# Patient Record
Sex: MALE | Race: WHITE | NOT HISPANIC OR LATINO | Employment: FULL TIME | URBAN - METROPOLITAN AREA
[De-identification: names, ages, dates, MRNs, and addresses within clinical notes are randomized per-mention and may not be internally consistent; named-entity substitution may affect disease eponyms.]

---

## 2021-04-26 ENCOUNTER — LAB VISIT (OUTPATIENT)
Dept: LAB | Facility: HOSPITAL | Age: 21
End: 2021-04-26
Attending: INTERNAL MEDICINE
Payer: COMMERCIAL

## 2021-04-26 ENCOUNTER — TELEPHONE (OUTPATIENT)
Dept: FAMILY MEDICINE | Facility: CLINIC | Age: 21
End: 2021-04-26

## 2021-04-26 ENCOUNTER — OFFICE VISIT (OUTPATIENT)
Dept: FAMILY MEDICINE | Facility: CLINIC | Age: 21
End: 2021-04-26
Payer: COMMERCIAL

## 2021-04-26 VITALS
BODY MASS INDEX: 22.29 KG/M2 | HEIGHT: 67 IN | DIASTOLIC BLOOD PRESSURE: 60 MMHG | WEIGHT: 142 LBS | TEMPERATURE: 98 F | SYSTOLIC BLOOD PRESSURE: 101 MMHG | HEART RATE: 68 BPM

## 2021-04-26 DIAGNOSIS — Z11.59 ENCOUNTER FOR HEPATITIS C SCREENING TEST FOR LOW RISK PATIENT: ICD-10-CM

## 2021-04-26 DIAGNOSIS — Z13.220 ENCOUNTER FOR LIPID SCREENING FOR CARDIOVASCULAR DISEASE: ICD-10-CM

## 2021-04-26 DIAGNOSIS — Z11.4 SCREENING FOR HIV (HUMAN IMMUNODEFICIENCY VIRUS): ICD-10-CM

## 2021-04-26 DIAGNOSIS — R63.0 DECREASED APPETITE: Primary | ICD-10-CM

## 2021-04-26 DIAGNOSIS — Z13.6 ENCOUNTER FOR LIPID SCREENING FOR CARDIOVASCULAR DISEASE: ICD-10-CM

## 2021-04-26 DIAGNOSIS — F33.1 MODERATE EPISODE OF RECURRENT MAJOR DEPRESSIVE DISORDER: ICD-10-CM

## 2021-04-26 DIAGNOSIS — F60.3 BORDERLINE PERSONALITY DISORDER: ICD-10-CM

## 2021-04-26 DIAGNOSIS — R63.0 DECREASED APPETITE: ICD-10-CM

## 2021-04-26 DIAGNOSIS — Z00.00 ENCOUNTER FOR ANNUAL HEALTH EXAMINATION: ICD-10-CM

## 2021-04-26 LAB
ALBUMIN SERPL BCP-MCNC: 5 G/DL (ref 3.5–5.2)
ALP SERPL-CCNC: 49 U/L (ref 55–135)
ALT SERPL W/O P-5'-P-CCNC: 15 U/L (ref 10–44)
ANION GAP SERPL CALC-SCNC: 8 MMOL/L (ref 8–16)
AST SERPL-CCNC: 16 U/L (ref 10–40)
BILIRUB SERPL-MCNC: 1.2 MG/DL (ref 0.1–1)
BUN SERPL-MCNC: 18 MG/DL (ref 6–20)
CALCIUM SERPL-MCNC: 9.6 MG/DL (ref 8.7–10.5)
CHLORIDE SERPL-SCNC: 108 MMOL/L (ref 95–110)
CHOLEST SERPL-MCNC: 112 MG/DL (ref 120–199)
CHOLEST/HDLC SERPL: 2.7 {RATIO} (ref 2–5)
CO2 SERPL-SCNC: 26 MMOL/L (ref 23–29)
CREAT SERPL-MCNC: 0.9 MG/DL (ref 0.5–1.4)
ERYTHROCYTE [DISTWIDTH] IN BLOOD BY AUTOMATED COUNT: 12.4 % (ref 11.5–14.5)
EST. GFR  (AFRICAN AMERICAN): >60 ML/MIN/1.73 M^2
EST. GFR  (NON AFRICAN AMERICAN): >60 ML/MIN/1.73 M^2
GLUCOSE SERPL-MCNC: 99 MG/DL (ref 70–110)
HCT VFR BLD AUTO: 48 % (ref 40–54)
HDLC SERPL-MCNC: 41 MG/DL (ref 40–75)
HDLC SERPL: 36.6 % (ref 20–50)
HGB BLD-MCNC: 16.7 G/DL (ref 14–18)
LDLC SERPL CALC-MCNC: 63.2 MG/DL (ref 63–159)
MCH RBC QN AUTO: 31.2 PG (ref 27–31)
MCHC RBC AUTO-ENTMCNC: 34.8 G/DL (ref 32–36)
MCV RBC AUTO: 90 FL (ref 82–98)
NONHDLC SERPL-MCNC: 71 MG/DL
PLATELET # BLD AUTO: 246 K/UL (ref 150–450)
PMV BLD AUTO: 10.9 FL (ref 9.2–12.9)
POTASSIUM SERPL-SCNC: 4 MMOL/L (ref 3.5–5.1)
PROT SERPL-MCNC: 7.4 G/DL (ref 6–8.4)
RBC # BLD AUTO: 5.35 M/UL (ref 4.6–6.2)
SODIUM SERPL-SCNC: 142 MMOL/L (ref 136–145)
TRIGL SERPL-MCNC: 39 MG/DL (ref 30–150)
TSH SERPL DL<=0.005 MIU/L-ACNC: 0.98 UIU/ML (ref 0.4–4)
WBC # BLD AUTO: 5.29 K/UL (ref 3.9–12.7)

## 2021-04-26 PROCEDURE — 3008F PR BODY MASS INDEX (BMI) DOCUMENTED: ICD-10-PCS | Mod: CPTII,S$GLB,, | Performed by: INTERNAL MEDICINE

## 2021-04-26 PROCEDURE — 99203 PR OFFICE/OUTPT VISIT, NEW, LEVL III, 30-44 MIN: ICD-10-PCS | Mod: S$GLB,,, | Performed by: INTERNAL MEDICINE

## 2021-04-26 PROCEDURE — 80053 COMPREHEN METABOLIC PANEL: CPT | Performed by: INTERNAL MEDICINE

## 2021-04-26 PROCEDURE — 99999 PR PBB SHADOW E&M-NEW PATIENT-LVL III: ICD-10-PCS | Mod: PBBFAC,,, | Performed by: INTERNAL MEDICINE

## 2021-04-26 PROCEDURE — 85027 COMPLETE CBC AUTOMATED: CPT | Performed by: INTERNAL MEDICINE

## 2021-04-26 PROCEDURE — 36415 COLL VENOUS BLD VENIPUNCTURE: CPT | Mod: PO | Performed by: INTERNAL MEDICINE

## 2021-04-26 PROCEDURE — 84443 ASSAY THYROID STIM HORMONE: CPT | Performed by: INTERNAL MEDICINE

## 2021-04-26 PROCEDURE — 1126F AMNT PAIN NOTED NONE PRSNT: CPT | Mod: S$GLB,,, | Performed by: INTERNAL MEDICINE

## 2021-04-26 PROCEDURE — 80061 LIPID PANEL: CPT | Performed by: INTERNAL MEDICINE

## 2021-04-26 PROCEDURE — 86803 HEPATITIS C AB TEST: CPT | Performed by: INTERNAL MEDICINE

## 2021-04-26 PROCEDURE — 99999 PR PBB SHADOW E&M-NEW PATIENT-LVL III: CPT | Mod: PBBFAC,,, | Performed by: INTERNAL MEDICINE

## 2021-04-26 PROCEDURE — 99203 OFFICE O/P NEW LOW 30 MIN: CPT | Mod: S$GLB,,, | Performed by: INTERNAL MEDICINE

## 2021-04-26 PROCEDURE — 3008F BODY MASS INDEX DOCD: CPT | Mod: CPTII,S$GLB,, | Performed by: INTERNAL MEDICINE

## 2021-04-26 PROCEDURE — 86703 HIV-1/HIV-2 1 RESULT ANTBDY: CPT | Performed by: INTERNAL MEDICINE

## 2021-04-26 PROCEDURE — 1126F PR PAIN SEVERITY QUANTIFIED, NO PAIN PRESENT: ICD-10-PCS | Mod: S$GLB,,, | Performed by: INTERNAL MEDICINE

## 2021-04-27 LAB
HCV AB SERPL QL IA: NEGATIVE
HIV 1+2 AB+HIV1 P24 AG SERPL QL IA: NEGATIVE

## 2021-05-06 ENCOUNTER — PATIENT MESSAGE (OUTPATIENT)
Dept: PSYCHIATRY | Facility: CLINIC | Age: 21
End: 2021-05-06

## 2021-05-06 ENCOUNTER — OFFICE VISIT (OUTPATIENT)
Dept: PSYCHIATRY | Facility: CLINIC | Age: 21
End: 2021-05-06
Payer: COMMERCIAL

## 2021-05-06 DIAGNOSIS — F99 INSOMNIA DUE TO OTHER MENTAL DISORDER: ICD-10-CM

## 2021-05-06 DIAGNOSIS — F12.10 MILD CANNABIS USE DISORDER: ICD-10-CM

## 2021-05-06 DIAGNOSIS — F51.05 INSOMNIA DUE TO OTHER MENTAL DISORDER: ICD-10-CM

## 2021-05-06 DIAGNOSIS — F60.3 BORDERLINE PERSONALITY DISORDER: ICD-10-CM

## 2021-05-06 DIAGNOSIS — F33.2 SEVERE EPISODE OF RECURRENT MAJOR DEPRESSIVE DISORDER, WITHOUT PSYCHOTIC FEATURES: ICD-10-CM

## 2021-05-06 DIAGNOSIS — F41.1 GAD (GENERALIZED ANXIETY DISORDER): ICD-10-CM

## 2021-05-06 DIAGNOSIS — Z87.891 TOBACCO QUIT DATE ESTABLISHED: ICD-10-CM

## 2021-05-06 PROCEDURE — 99205 PR OFFICE/OUTPT VISIT, NEW, LEVL V, 60-74 MIN: ICD-10-PCS | Mod: 95,,, | Performed by: NURSE PRACTITIONER

## 2021-05-06 PROCEDURE — 99205 OFFICE O/P NEW HI 60 MIN: CPT | Mod: 95,,, | Performed by: NURSE PRACTITIONER

## 2021-05-06 RX ORDER — QUETIAPINE FUMARATE 100 MG/1
100 TABLET, FILM COATED ORAL NIGHTLY
Qty: 30 TABLET | Refills: 0 | Status: SHIPPED | OUTPATIENT
Start: 2021-05-06 | End: 2021-05-18 | Stop reason: SDUPTHER

## 2021-05-06 RX ORDER — SERTRALINE HYDROCHLORIDE 50 MG/1
50 TABLET, FILM COATED ORAL DAILY
Qty: 30 TABLET | Refills: 0 | Status: SHIPPED | OUTPATIENT
Start: 2021-05-14 | End: 2021-05-18 | Stop reason: SDUPTHER

## 2021-05-06 RX ORDER — SERTRALINE HYDROCHLORIDE 25 MG/1
25 TABLET, FILM COATED ORAL DAILY
Qty: 7 TABLET | Refills: 0 | Status: SHIPPED | OUTPATIENT
Start: 2021-05-06 | End: 2021-05-13

## 2021-05-18 ENCOUNTER — OFFICE VISIT (OUTPATIENT)
Dept: PSYCHIATRY | Facility: CLINIC | Age: 21
End: 2021-05-18
Payer: COMMERCIAL

## 2021-05-18 DIAGNOSIS — F33.2 SEVERE EPISODE OF RECURRENT MAJOR DEPRESSIVE DISORDER, WITHOUT PSYCHOTIC FEATURES: ICD-10-CM

## 2021-05-18 DIAGNOSIS — F41.1 GAD (GENERALIZED ANXIETY DISORDER): ICD-10-CM

## 2021-05-18 DIAGNOSIS — F51.05 INSOMNIA DUE TO OTHER MENTAL DISORDER: ICD-10-CM

## 2021-05-18 DIAGNOSIS — F60.3 BORDERLINE PERSONALITY DISORDER: ICD-10-CM

## 2021-05-18 DIAGNOSIS — F99 INSOMNIA DUE TO OTHER MENTAL DISORDER: ICD-10-CM

## 2021-05-18 PROBLEM — Z87.891 TOBACCO QUIT DATE ESTABLISHED: Status: ACTIVE | Noted: 2021-05-18

## 2021-05-18 PROBLEM — F12.10 MILD CANNABIS USE DISORDER: Status: ACTIVE | Noted: 2021-05-18

## 2021-05-18 PROCEDURE — 99214 PR OFFICE/OUTPT VISIT, EST, LEVL IV, 30-39 MIN: ICD-10-PCS | Mod: 95,,, | Performed by: NURSE PRACTITIONER

## 2021-05-18 PROCEDURE — 99214 OFFICE O/P EST MOD 30 MIN: CPT | Mod: 95,,, | Performed by: NURSE PRACTITIONER

## 2021-05-18 PROCEDURE — 90833 PSYTX W PT W E/M 30 MIN: CPT | Mod: 95,,, | Performed by: NURSE PRACTITIONER

## 2021-05-18 PROCEDURE — 90833 PR PSYCHOTHERAPY W/PATIENT W/E&M, 30 MIN (ADD ON): ICD-10-PCS | Mod: 95,,, | Performed by: NURSE PRACTITIONER

## 2021-05-18 RX ORDER — QUETIAPINE FUMARATE 100 MG/1
100 TABLET, FILM COATED ORAL NIGHTLY
Qty: 30 TABLET | Refills: 0 | Status: SHIPPED | OUTPATIENT
Start: 2021-06-06 | End: 2021-06-25

## 2021-05-18 RX ORDER — SERTRALINE HYDROCHLORIDE 50 MG/1
50 TABLET, FILM COATED ORAL DAILY
Qty: 30 TABLET | Refills: 0 | Status: SHIPPED | OUTPATIENT
Start: 2021-06-14 | End: 2021-06-25

## 2021-05-26 ENCOUNTER — OFFICE VISIT (OUTPATIENT)
Dept: PSYCHIATRY | Facility: CLINIC | Age: 21
End: 2021-05-26
Payer: COMMERCIAL

## 2021-05-26 DIAGNOSIS — F60.3 BORDERLINE PERSONALITY DISORDER: ICD-10-CM

## 2021-05-26 DIAGNOSIS — F99 INSOMNIA DUE TO OTHER MENTAL DISORDER: ICD-10-CM

## 2021-05-26 DIAGNOSIS — F33.1 MAJOR DEPRESSIVE DISORDER, RECURRENT EPISODE, MODERATE: Primary | ICD-10-CM

## 2021-05-26 DIAGNOSIS — F41.1 GAD (GENERALIZED ANXIETY DISORDER): ICD-10-CM

## 2021-05-26 DIAGNOSIS — F12.10 MILD CANNABIS USE DISORDER: ICD-10-CM

## 2021-05-26 DIAGNOSIS — F51.05 INSOMNIA DUE TO OTHER MENTAL DISORDER: ICD-10-CM

## 2021-05-26 PROCEDURE — 90791 PSYCH DIAGNOSTIC EVALUATION: CPT | Mod: S$GLB,,, | Performed by: SOCIAL WORKER

## 2021-05-26 PROCEDURE — 99999 PR PBB SHADOW E&M-EST. PATIENT-LVL I: ICD-10-PCS | Mod: PBBFAC,,, | Performed by: SOCIAL WORKER

## 2021-05-26 PROCEDURE — 90791 PR PSYCHIATRIC DIAGNOSTIC EVALUATION: ICD-10-PCS | Mod: S$GLB,,, | Performed by: SOCIAL WORKER

## 2021-05-26 PROCEDURE — 99999 PR PBB SHADOW E&M-EST. PATIENT-LVL I: CPT | Mod: PBBFAC,,, | Performed by: SOCIAL WORKER

## 2021-06-23 DIAGNOSIS — F33.2 SEVERE EPISODE OF RECURRENT MAJOR DEPRESSIVE DISORDER, WITHOUT PSYCHOTIC FEATURES: ICD-10-CM

## 2021-06-23 DIAGNOSIS — F60.3 BORDERLINE PERSONALITY DISORDER: ICD-10-CM

## 2021-06-23 DIAGNOSIS — F99 INSOMNIA DUE TO OTHER MENTAL DISORDER: ICD-10-CM

## 2021-06-23 DIAGNOSIS — F51.05 INSOMNIA DUE TO OTHER MENTAL DISORDER: ICD-10-CM

## 2021-06-23 DIAGNOSIS — F41.1 GAD (GENERALIZED ANXIETY DISORDER): ICD-10-CM

## 2021-06-25 RX ORDER — SERTRALINE HYDROCHLORIDE 50 MG/1
TABLET, FILM COATED ORAL
Qty: 30 TABLET | Refills: 0 | Status: SHIPPED | OUTPATIENT
Start: 2021-06-25 | End: 2021-06-29 | Stop reason: SDUPTHER

## 2021-06-25 RX ORDER — QUETIAPINE FUMARATE 100 MG/1
TABLET, FILM COATED ORAL
Qty: 30 TABLET | Refills: 0 | Status: SHIPPED | OUTPATIENT
Start: 2021-06-25 | End: 2021-06-29 | Stop reason: SDUPTHER

## 2021-06-28 ENCOUNTER — OFFICE VISIT (OUTPATIENT)
Dept: PSYCHIATRY | Facility: CLINIC | Age: 21
End: 2021-06-28
Payer: COMMERCIAL

## 2021-06-28 DIAGNOSIS — F99 INSOMNIA DUE TO OTHER MENTAL DISORDER: ICD-10-CM

## 2021-06-28 DIAGNOSIS — F33.2 SEVERE EPISODE OF RECURRENT MAJOR DEPRESSIVE DISORDER, WITHOUT PSYCHOTIC FEATURES: ICD-10-CM

## 2021-06-28 DIAGNOSIS — F51.05 INSOMNIA DUE TO OTHER MENTAL DISORDER: ICD-10-CM

## 2021-06-28 DIAGNOSIS — F41.1 GAD (GENERALIZED ANXIETY DISORDER): ICD-10-CM

## 2021-06-28 DIAGNOSIS — F60.3 BORDERLINE PERSONALITY DISORDER: ICD-10-CM

## 2021-06-28 PROCEDURE — 99214 OFFICE O/P EST MOD 30 MIN: CPT | Mod: 95,,, | Performed by: NURSE PRACTITIONER

## 2021-06-28 PROCEDURE — 99214 PR OFFICE/OUTPT VISIT, EST, LEVL IV, 30-39 MIN: ICD-10-PCS | Mod: 95,,, | Performed by: NURSE PRACTITIONER

## 2021-06-28 PROCEDURE — 90833 PSYTX W PT W E/M 30 MIN: CPT | Mod: 95,,, | Performed by: NURSE PRACTITIONER

## 2021-06-28 PROCEDURE — 90833 PR PSYCHOTHERAPY W/PATIENT W/E&M, 30 MIN (ADD ON): ICD-10-PCS | Mod: 95,,, | Performed by: NURSE PRACTITIONER

## 2021-06-29 RX ORDER — SERTRALINE HYDROCHLORIDE 50 MG/1
50 TABLET, FILM COATED ORAL DAILY
Qty: 30 TABLET | Refills: 2 | Status: SHIPPED | OUTPATIENT
Start: 2021-07-26 | End: 2021-10-26 | Stop reason: SDUPTHER

## 2021-06-29 RX ORDER — QUETIAPINE FUMARATE 100 MG/1
100 TABLET, FILM COATED ORAL NIGHTLY
Qty: 30 TABLET | Refills: 2 | Status: SHIPPED | OUTPATIENT
Start: 2021-07-26 | End: 2021-10-26 | Stop reason: SDUPTHER

## 2021-11-30 ENCOUNTER — PATIENT MESSAGE (OUTPATIENT)
Dept: PSYCHIATRY | Facility: CLINIC | Age: 21
End: 2021-11-30
Payer: COMMERCIAL

## 2021-12-01 ENCOUNTER — TELEPHONE (OUTPATIENT)
Dept: PSYCHIATRY | Facility: CLINIC | Age: 21
End: 2021-12-01
Payer: COMMERCIAL

## 2021-12-01 DIAGNOSIS — F41.1 GAD (GENERALIZED ANXIETY DISORDER): ICD-10-CM

## 2021-12-01 DIAGNOSIS — F33.2 SEVERE EPISODE OF RECURRENT MAJOR DEPRESSIVE DISORDER, WITHOUT PSYCHOTIC FEATURES: ICD-10-CM

## 2021-12-01 RX ORDER — SERTRALINE HYDROCHLORIDE 50 MG/1
50 TABLET, FILM COATED ORAL DAILY
Qty: 90 TABLET | Refills: 1 | Status: SHIPPED | OUTPATIENT
Start: 2021-12-01 | End: 2022-03-08 | Stop reason: SDUPTHER

## 2022-01-31 ENCOUNTER — PATIENT MESSAGE (OUTPATIENT)
Dept: PSYCHIATRY | Facility: CLINIC | Age: 22
End: 2022-01-31
Payer: COMMERCIAL

## 2022-02-03 ENCOUNTER — TELEPHONE (OUTPATIENT)
Dept: PSYCHIATRY | Facility: CLINIC | Age: 22
End: 2022-02-03
Payer: COMMERCIAL

## 2022-02-03 ENCOUNTER — PATIENT MESSAGE (OUTPATIENT)
Dept: PSYCHIATRY | Facility: CLINIC | Age: 22
End: 2022-02-03
Payer: COMMERCIAL

## 2022-02-03 NOTE — TELEPHONE ENCOUNTER
Phone not working, sent my chart message to let pt know appt for today will be canceled due to provider out

## 2022-02-08 ENCOUNTER — OFFICE VISIT (OUTPATIENT)
Dept: PSYCHIATRY | Facility: CLINIC | Age: 22
End: 2022-02-08
Payer: COMMERCIAL

## 2022-02-08 DIAGNOSIS — F51.05 INSOMNIA DUE TO OTHER MENTAL DISORDER: ICD-10-CM

## 2022-02-08 DIAGNOSIS — R63.5 WEIGHT GAIN DUE TO MEDICATION: ICD-10-CM

## 2022-02-08 DIAGNOSIS — Z01.89: ICD-10-CM

## 2022-02-08 DIAGNOSIS — F33.0 MDD (MAJOR DEPRESSIVE DISORDER), RECURRENT EPISODE, MILD: ICD-10-CM

## 2022-02-08 DIAGNOSIS — F60.3 BORDERLINE PERSONALITY DISORDER: ICD-10-CM

## 2022-02-08 DIAGNOSIS — T50.905A WEIGHT GAIN DUE TO MEDICATION: ICD-10-CM

## 2022-02-08 DIAGNOSIS — F41.1 GAD (GENERALIZED ANXIETY DISORDER): ICD-10-CM

## 2022-02-08 DIAGNOSIS — F99 INSOMNIA DUE TO OTHER MENTAL DISORDER: ICD-10-CM

## 2022-02-08 PROCEDURE — 90833 PSYTX W PT W E/M 30 MIN: CPT | Mod: 95,,, | Performed by: NURSE PRACTITIONER

## 2022-02-08 PROCEDURE — 90833 PR PSYCHOTHERAPY W/PATIENT W/E&M, 30 MIN (ADD ON): ICD-10-PCS | Mod: 95,,, | Performed by: NURSE PRACTITIONER

## 2022-02-08 PROCEDURE — 99215 OFFICE O/P EST HI 40 MIN: CPT | Mod: 95,,, | Performed by: NURSE PRACTITIONER

## 2022-02-08 PROCEDURE — 99215 PR OFFICE/OUTPT VISIT, EST, LEVL V, 40-54 MIN: ICD-10-PCS | Mod: 95,,, | Performed by: NURSE PRACTITIONER

## 2022-02-08 RX ORDER — ZIPRASIDONE HYDROCHLORIDE 20 MG/1
CAPSULE ORAL
Qty: 60 CAPSULE | Refills: 0 | Status: SHIPPED | OUTPATIENT
Start: 2022-02-10 | End: 2022-03-11

## 2022-02-08 NOTE — PROGRESS NOTES
The patient location is:  [place], [place] at work    The chief complaint leading to consultation is:  Medication Mgt Follow Up    Visit type: audiovisual    Each patient to whom he or she provides medical services by telemedicine is:  (1) informed of the relationship between the physician and patient and the respective role of any other health care provider with respect to management of the patient; and (2) notified that he or she may decline to receive medical services by telemedicine and may withdraw from such care at any time.    Kiet Paulson   @  @   [phone]      Outpatient Psychiatry Follow-Up Visit with MD        Clinical Status of Patient: Outpatient (Ambulatory)    Chief Complaint:  Kiet Paulson is a 20 y.o. male who presents today for follow-up of depression, mood disorder, anxiety and insomnia.  Met with patient.      Interval History:  Patient presented 2 weeks ago on 05/06/2021 with a history of BPD.  Dx with BPD, MDD, RISHI, Insomnia, rule out Bipolar I  He was prescribed:  quetiapine fumarate 100 mg Oral Nightly, Break or cut in half the first 3 nights taking 50 mg (one half tablet) then on the 4th day increase to one whole tablet (100 mg)  sertraline HCl  50 mg Oral Daily  25 mg Oral Daily  He was scheduled a 2 week med mgt follow up and referred to psychotherapy.        Content of Current Session 2/8/2022:   Interview conducted and patient (pt) examined by this PMHNP. Pt is awake, alert, and oriented x 4.  Patient is calm and cooperative during interview.     Pt reports he was 140---145 when first started taking the medication and now 208 pounds. Patient reports has gained weight really fast still gains weight non-stop.    Denies eating junk food, stopped drinking soda, stopped eating red meat---still no improvement..  States has picked up more physical activinty  Pt reports has lost muscle mass and has more of  fatty weight--pt reports has gained a lot of weight in his stomach    Pt  continues to deny SI, HI, AVH, and paranoid thoughts.  Denies issues with sleep.  Continues to verbalize to his Anxiety and depression are under control with current medication regimen.  Son is now 2 years old---states he is very smart.    Discontinuing QUEtiapine (SEROQUEL) 100 MG TabTake 1 tablet (100 mg total) and break it in half to take (50 mg) for 2 nights 2/8 and 2/9--then stop taking.  Continue to take sertraline (ZOLOFT) 50 MG tablet  Take 1 tablet (50 mg total) by mouth once daily. Take 1 tablet (50 mg total) by mouth once daily., Starting Wed 12/1/2021, Until Mon 5/30/2022.    Pt reports he isn't drinking alcoholic beverages.  Pt reports he is not using illicit substances.      Pt reports he started college and is doing well- at [employer].  Spending time with his son.  Giving parents--mother rent $700 per month while he and his family live with them.  Wife is now licensed massage therapist---starts work [employer] plan to move into own home in a few months.      Pt is goal directed and doing well.  In a few months he will be in remission of symptoms if theses same results continue.      Pt reports he is complying with his medication regimen.  Pt denies any adverse affects or side effects with regards to medication regimen.  Pt reports medications are working and his overall condition is improving from previous assessment.    Pt is stable at this time. Pt denies any questions or concerns. Will continue to monitor and reassess.       [facility] Gad[facility]    Question 2/8/2022  1:47 PM CST - Filed by Patient   Feeling nervous, anxious, on edge Not at all   Not being able to stop or control worrying Not at all   Worrying too much about different things Not at all   Trouble relaxing Not at all   Being so restless that its hard to sit still Not at all   Becoming easily annoyed or irritable Not at all   Feeling afraid as if something awful might happen Not at all   TOTAL SCORE: (range: 0 - 21) 0  "    [facility] Documents    Question 2/8/2022  1:47 PM CST - Filed by Patient   Would you like a copy of [facility]'s Financial Assistance Policy Summary? No, I would not like a copy.   Would you like to receive more information regarding your rights and protections under the [place] Billing act? No, I would not.     [facility] Tele[facility] Phq9    Question 2/8/2022  1:48 PM CST - Filed by Patient   By selecting "I understand," you acknowledge that the answers that you provide for this questionnaire may not be immediately viewed by your provider or your care team. If you are personally dealing with suicidal thoughts or a crisis, please consider contacting your provider's office.  If your provider is not available, please consider taking action by: calling 911 or the National Suicide Prevention Lifeline any day, any time at 1-810.187.7015 or texting SIGNS to 078401 for 24/7 anonymous, free crisis counseling. I understand   1. Little interest or pleasure in doing things Not at all   2. Feeling down, depressed, or hopeless Not at all   3. Trouble falling or staying asleep, or sleeping too much Not at all   4. Feeling tired or having little energy Not at all   5. poor appetite or overeating Not at all   6. Feeling bad about yourself - or that you are a failure or have let yourself or your family down Not at all   7. Trouble concentrating on things, such as reading the newspaper or watching television Not at all   8. Moving or speaking so slowly that other people could have noticed? Or the opposite - being so fidgety or restless that you have been moving around a lot more than usual Not at all   9. Thoughts that you would be better off dead or hurting yourself in some way Not at all   If you indicated that you had ANY of the problems listed in questions 1-9, how difficult have these problems made it for you to do your work, take care of things at home, or get along with other people? Not difficult at all   TOTAL SCORE (range: " "0 - 27) 0 (None)         Review of Systems    General : NO chills or fever   Eyes: NO  visual changes   ENT: NO hearing change, nasal discharge or sore throat   Endocrine: NO weight changes or polydipsia/polyuria   Dermatological: NO rashes   Respiratory: NO cough, shortness of breath   Cardiovascular: NO chest pain, palpitations or racing heart   Gastrointestinal: NO nausea, vomiting, constipation or diarrhea   Musculoskeletal: NO muscle pain or stiffness   Neurological: NO confusion, dizziness, headaches or tremors   Psychiatric: please see HPI    Past Medical, Family and Social History: The patient's past medical, family and social history have been reviewed and updated as appropriate within the electronic medical record - see encounter notes.    Compliance: see above    Side effects: see above    Risk Parameters:  Patient reports no suicidal ideation  Patient reports no homicidal ideation  Patient reports no self-injurious behavior  Patient reports no violent behavior    Exam (detailed: at least 9 elements; comprehensive: all 15 elements)     There were no vitals filed for this visit.    Constitutional  Vitals:  Most recent vital signs, dated 5/6/2021, were reviewed.   There were no vitals filed for this visit.     General:  unremarkable, age appropriate, neatly groomed     Musculoskeletal  Muscle Strength/Tone:  not examined   Gait & Station:  [facility]     Psychiatric  Arousal: Alert, awake  Appearance:  fair grooming  Sensorium/Orientation: Oriented to person, place, situation, month and year  Behavior/Cooperation: Cooperative, friendly  Psychomotor: No PMA or PMR  Speech: Normal rate, rhythm, prosody and tone  Language: Fluent english  Mood: "Good"  Affect: Reactive, mood congruent  Thought Process: Linear   Thought Content: No SI/HI; no hallucinations or delusions  Associations: Intact  Attention/Concentration: Intact  Memory: Recent and remote intact  Fund of Knowledge: Appropriate for education level "   Insight: Fair   Judgment: Appropriate for setting    Lab Visit on 04/26/2021   Component Date Value Ref Range Status    WBC 04/26/2021 5.29  3.9 - 12.7 K/uL Final    RBC 04/26/2021 5.35  4.60 - 6.20 M/uL Final    Hemoglobin 04/26/2021 16.7  14.0 - 18.0 g/dL Final    Hematocrit 04/26/2021 48.0  40.0 - 54.0 % Final    MCV 04/26/2021 90  82.0 - 98.0 fL Final    MCH 04/26/2021 31.2* 27.0 - 31.0 pg Final    MCHC 04/26/2021 34.8  32.0 - 36.0 g/dL Final    RDW 04/26/2021 12.4  11.5 - 14.5 % Final    Platelets 04/26/2021 246  150 - 450 K/uL Final    MPV 04/26/2021 10.9  9.2 - 12.9 fL Final    Sodium 04/26/2021 142  136 - 145 mmol/L Final    Potassium 04/26/2021 4.0  3.5 - 5.1 mmol/L Final    Chloride 04/26/2021 108  95 - 110 mmol/L Final    CO2 04/26/2021 26  23 - 29 mmol/L Final    Glucose 04/26/2021 99  70 - 110 mg/dL Final    BUN 04/26/2021 18  6 - 20 mg/dL Final    Creatinine 04/26/2021 0.9  0.5 - 1.4 mg/dL Final    Calcium 04/26/2021 9.6  8.7 - 10.5 mg/dL Final    Total Protein 04/26/2021 7.4  6.0 - 8.4 g/dL Final    Albumin 04/26/2021 5.0  3.5 - 5.2 g/dL Final    Total Bilirubin 04/26/2021 1.2* 0.1 - 1.0 mg/dL Final    Alkaline Phosphatase 04/26/2021 49* 55 - 135 U/L Final    AST 04/26/2021 16  10 - 40 U/L Final    ALT 04/26/2021 15  10 - 44 U/L Final    Anion Gap 04/26/2021 8  8 - 16 mmol/L Final    eGFR if African American 04/26/2021 >60.0  >60 mL/min/1.73 m^2 Final    eGFR if non African American 04/26/2021 >60.0  >60 mL/min/1.73 m^2 Final    Cholesterol 04/26/2021 112* 120 - 199 mg/dL Final    Triglycerides 04/26/2021 39  30 - 150 mg/dL Final    HDL 04/26/2021 41  40 - 75 mg/dL Final    LDL Cholesterol 04/26/2021 63.2  63 - 159 mg/dL Final    HDL/Cholesterol Ratio 04/26/2021 36.6  20.0 - 50.0 % Final    Total Cholesterol/HDL Ratio 04/26/2021 2.7  2.0 - 5.0 Final    Non-HDL Cholesterol 04/26/2021 71  mg/dL Final    TSH 04/26/2021 0.976  0.40 - 4.00 uIU/mL Final    Hepatitis C  Ab 04/26/2021 Negative  Negative Final    HIV 1/2 Ag/Ab 04/26/2021 Negative  Negative Final       LAPMP    Assessed safety-Reminded of the number and the availability of clinic staff and ER/Hospitals     Assessment and Diagnosis     Status/Progress: Based on the examination today, the patient's problem(s) is/are improving. New problems have not presented today. Co-morbidities are not complicating management of the primary condition. There are not active rule-out diagnoses for this patient at this time.     General Impression: Kiet Paulson, a 20 y.o. male, presenting for medication management follow up visit.    Pt has a previous history of BPD, MDD, RISHI, and Insomnia.      1. Insomnia due to other mental disorder  ziprasidone (GEODON) 20 MG Cap    Nursing communication   2. Borderline personality disorder  ziprasidone (GEODON) 20 MG Cap    Nursing communication   3. Encounter for basal metabolic rate test  Hemoglobin A1C   4. Weight gain due to medication  Hemoglobin A1C   5. RISHI (generalized anxiety disorder)     6. MDD (major depressive disorder), recurrent episode, mild  Nursing communication     Patient is improving and so far is responding well. .          Intervention/Counseling/Treatment Plan   Medical Decision Making:   · Medication Management:   Medications Ordered This Encounter   Medications    ziprasidone (GEODON) 20 MG Cap     Sig: Take 1 capsule (20 mg) at night for 2 weeks 02/10--02/24 and 02/25--03/11 take 2 capsules (40 mg) at night for 2 weeks     Dispense:  60 capsule     Refill:  0     Discontinuing QUEtiapine (SEROQUEL) 100 MG TabTake 1 tablet (100 mg total) and break it in half to take (50 mg) for 2 nights 2/8 and 2/9--then stop taking.  Continue to take sertraline (ZOLOFT) 50 MG tablet  Take 1 tablet (50 mg total) by mouth once daily. Take 1 tablet (50 mg total) by mouth once daily., Starting Wed 12/1/2021, Until Mon 5/30/2022.      · Labs, Diagnostic Studies:            Hemoglobin A1C                   Future, Expected: 2/8/2022, Expires: 4/9/2023, Routine, Lab Collect, Resulting Agency - [facility]       · Outside records/collateral information from additional sources:  · Care Coordination: N/a at this time  · Duration of visit: 48 minutes.      Psychotherapy:  · Target symptoms: depression, anxiety , substance abuse, mood swings  · Why chosen therapy is appropriate versus another modality: relevant to diagnosis, patient responds to this modality, evidence based practice  · Outcome monitoring methods: self-report  · Therapeutic intervention type: insight oriented psychotherapy, behavior modifying psychotherapy, supportive psychotherapy  · Topics discussed/themes: building skills sets for symptom management, symptom recognition, substance abuse  · The patient's response to the intervention is accepting. The patient's progress toward treatment goals is good.   · Duration of intervention: 16 minutes.    Discussed diagnosis, risks and benefits of proposed treatment above vs alternative treatments vs no treatment, and potential side effects of these treatments. Patient expresses understanding of the above and displays the capacity to agree with this treatment given said understanding. Patient also agrees that, currently, the benefits outweigh the risks and would like to pursue treatment at this time.     Return to Clinic: 3 months if medication change is effective 1 month or as needed if ineffective depending on level of acuity       Add on Psychotherapy time: 16 minutes  Length of Visit and chart review: 60 minutes      DONTAE Landry-BC [facility]    This note may have been partially created with [employer] Natural speaking word recognition program.  There may be word recognition mistakes that are occasionally missed on review.  Please interpret accordingly.

## 2022-02-08 NOTE — PATIENT INSTRUCTIONS
Good Morning/Good Afternoon:    It was a pleasure seeing you today.  Please remember if you have any questions or concerns to reach out to the office via [employer] Message or calling the office at [phone].  Remember, messages and calls are responded to by office staff Monday through Friday 7:00 AM --5:00 PM.  There are no after hour or on call staff in the Psychiatry Department for evening/nights/ or weekends.  Any messages sent during these time frames are not seen by staff until the next business day.  This provider is in the office Tuesday--Friday and will receive any weekend and Monday notifications on Tuesday.    If you have any paperwork you need this provider to complete; the [facility] has a 7 to 10 business day for completion policy from the date when the provider physically receives the document(s).  You will be notified via Akvo on the day this provider receives your paperwork.       If in Crisis call the Lifeline (999) 273-TALK, and 911 for immediate assistance.      If an urgent or emergent need develops and you are experiencing any symptoms that are affecting your ability to function normally, having thoughts of self harm or harming anyone else, call 911 or report to the closest Emergency Department.    [employer] is a reputable source for general information regarding mental health [email or web address]    Psychiatry staff will schedule your next appointment for medication management in [facility] for the time frame we discussed (3 months if medication change is effective; 1 month or as needed if ineffective depending on level of acuity).       · Medication Management:   Medications Ordered This Encounter   Medications    ziprasidone (GEODON) 20 MG Cap     Sig: Take 1 capsule (20 mg) at night for 2 weeks 02/10--02/24 and 02/25--03/11 take 2 capsules (40 mg) at night for 2 weeks     Dispense:  60 capsule     Refill:  0     Discontinuing QUEtiapine (SEROQUEL) 100 MG TabTake 1  tablet (100 mg total) and break it in half to take (50 mg) for 2 nights 2/8 and 2/9--then stop taking.  Continue to take sertraline (ZOLOFT) 50 MG tablet  Take 1 tablet (50 mg total) by mouth once daily. Take 1 tablet (50 mg total) by mouth once daily., Starting Wed 12/1/2021, Until Mon 5/30/2022.        · Labs, Diagnostic Studies:            Hemoglobin A1C                  Future, Expected: 2/8/2022, Expires: 4/9/2023, Routine, Lab Collect, Resulting Agency - [facility]

## 2022-02-09 ENCOUNTER — LAB VISIT (OUTPATIENT)
Dept: LAB | Facility: HOSPITAL | Age: 22
End: 2022-02-09
Attending: NURSE PRACTITIONER
Payer: COMMERCIAL

## 2022-02-09 DIAGNOSIS — T50.905A WEIGHT GAIN DUE TO MEDICATION: ICD-10-CM

## 2022-02-09 DIAGNOSIS — Z01.89: ICD-10-CM

## 2022-02-09 DIAGNOSIS — R63.5 WEIGHT GAIN DUE TO MEDICATION: ICD-10-CM

## 2022-02-09 PROCEDURE — 36415 COLL VENOUS BLD VENIPUNCTURE: CPT | Performed by: NURSE PRACTITIONER

## 2022-02-09 PROCEDURE — 83036 HEMOGLOBIN GLYCOSYLATED A1C: CPT | Performed by: NURSE PRACTITIONER

## 2022-02-10 LAB
ESTIMATED AVG GLUCOSE: 94 MG/DL (ref 68–131)
HBA1C MFR BLD: 4.9 % (ref 4–5.6)

## 2022-03-08 ENCOUNTER — TELEPHONE (OUTPATIENT)
Dept: PSYCHIATRY | Facility: CLINIC | Age: 22
End: 2022-03-08
Payer: COMMERCIAL

## 2022-03-11 RX ORDER — ZIPRASIDONE HYDROCHLORIDE 40 MG/1
40 CAPSULE ORAL NIGHTLY
Qty: 30 CAPSULE | Refills: 2 | Status: SHIPPED | OUTPATIENT
Start: 2022-03-11 | End: 2022-04-19

## 2022-04-19 ENCOUNTER — OFFICE VISIT (OUTPATIENT)
Dept: PSYCHIATRY | Facility: CLINIC | Age: 22
End: 2022-04-19
Payer: COMMERCIAL

## 2022-04-19 DIAGNOSIS — T50.905A WEIGHT GAIN DUE TO MEDICATION: ICD-10-CM

## 2022-04-19 DIAGNOSIS — F60.3 BORDERLINE PERSONALITY DISORDER: ICD-10-CM

## 2022-04-19 DIAGNOSIS — F51.05 INSOMNIA DUE TO OTHER MENTAL DISORDER: ICD-10-CM

## 2022-04-19 DIAGNOSIS — F41.1 GAD (GENERALIZED ANXIETY DISORDER): ICD-10-CM

## 2022-04-19 DIAGNOSIS — F33.0 MDD (MAJOR DEPRESSIVE DISORDER), RECURRENT EPISODE, MILD: ICD-10-CM

## 2022-04-19 DIAGNOSIS — F99 INSOMNIA DUE TO OTHER MENTAL DISORDER: ICD-10-CM

## 2022-04-19 DIAGNOSIS — Z87.891 TOBACCO QUIT DATE ESTABLISHED: ICD-10-CM

## 2022-04-19 DIAGNOSIS — R63.5 WEIGHT GAIN DUE TO MEDICATION: ICD-10-CM

## 2022-04-19 PROCEDURE — 3044F PR MOST RECENT HEMOGLOBIN A1C LEVEL <7.0%: ICD-10-PCS | Mod: CPTII,95,, | Performed by: NURSE PRACTITIONER

## 2022-04-19 PROCEDURE — 99215 PR OFFICE/OUTPT VISIT, EST, LEVL V, 40-54 MIN: ICD-10-PCS | Mod: 95,,, | Performed by: NURSE PRACTITIONER

## 2022-04-19 PROCEDURE — 3044F HG A1C LEVEL LT 7.0%: CPT | Mod: CPTII,95,, | Performed by: NURSE PRACTITIONER

## 2022-04-19 PROCEDURE — 99215 OFFICE O/P EST HI 40 MIN: CPT | Mod: 95,,, | Performed by: NURSE PRACTITIONER

## 2022-04-19 RX ORDER — ZIPRASIDONE HYDROCHLORIDE 80 MG/1
80 CAPSULE ORAL NIGHTLY
Qty: 30 CAPSULE | Refills: 2 | Status: SHIPPED | OUTPATIENT
Start: 2022-04-19 | End: 2022-08-02 | Stop reason: SDUPTHER

## 2022-04-19 NOTE — PROGRESS NOTES
The patient location is:  [place], [place] at work    The chief complaint leading to consultation is:  Medication Mgt Follow Up    Visit type: audiovisual    Each patient to whom he or she provides medical services by telemedicine is:  (1) informed of the relationship between the physician and patient and the respective role of any other health care provider with respect to management of the patient; and (2) notified that he or she may decline to receive medical services by telemedicine and may withdraw from such care at any time.    Kiet Paulson   @  @   [phone]      Outpatient Psychiatry Follow-Up Visit with MD        Clinical Status of Patient: Outpatient (Ambulatory)    Chief Complaint:  Kiet Paulson is a 20 y.o. male who presents today for follow-up of depression, mood disorder, anxiety and insomnia.  Met with patient.      Interval History:  Patient presented 2 weeks ago on 05/06/2021 with a history of BPD.  Dx with BPD, MDD, RISHI, Insomnia, rule out Bipolar I  He was prescribed:  quetiapine fumarate 100 mg Oral Nightly, Break or cut in half the first 3 nights taking 50 mg (one half tablet) then on the 4th day increase to one whole tablet (100 mg)  sertraline HCl  50 mg Oral Daily  25 mg Oral Daily  He was scheduled a 2 week med mgt follow up and referred to psychotherapy.        Content of Current Session 04/19/22::   Interview conducted and patient (pt) examined by this PMHNP. Pt is awake, alert, and oriented x 4.  Patient is calm and cooperative during interview.     Finished with a 4.0 GPA this semester--Major in    Pt has a glow on his face regarding his major when discussing  What his career responsiilities and tasks are.    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    Has slimmed down some per pt and feels like he has more energy, feels happy with the geodon    Pt reports he was 140---145 when first started taking  the medication and now 208 pounds. Patient reports has gained weight really fast still gains weight non-stop.    Denies eating junk food, stopped drinking soda, stopped eating red meat---still no improvement..  States has picked up more physical activinty  Pt reports has lost muscle mass and has more of  fatty weight--pt reports has gained a lot of weight in his stomach    Pt continues to deny SI, HI, AVH, and paranoid thoughts.  Denies issues with sleep.  Continues to verbalize to his Anxiety and depression are under control with current medication regimen.  Son is now 2 years old---states he is very smart.    Discontinuing QUEtiapine (SEROQUEL) 100 MG TabTake 1 tablet (100 mg total) and break it in half to take (50 mg) for 2 nights 2/8 and 2/9--then stop taking.  Continue to take sertraline (ZOLOFT) 50 MG tablet  Take 1 tablet (50 mg total) by mouth once daily. Take 1 tablet (50 mg total) by mouth once daily., Starting Wed 12/1/2021, Until Mon 5/30/2022.    Pt reports he isn't drinking alcoholic beverages.  Pt reports he is not using illicit substances.      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  Spending time with his son.  Giving parents--mother rent $700 per month while he and his family live with them.  Wife is now licensed massage therapist---starts work [employer] plan to move into own home in a few months.      Pt is goal directed and doing well.  In a few months he will be in remission of symptoms if theses same results continue.      Pt reports he is complying with his medication regimen.  Pt denies any adverse affects or side effects with regards to medication regimen.  Pt reports medications are working and his overall condition is improving from previous assessment.    Pt is stable at this time. Pt denies any questions or concerns. Will continue to monitor and reassess.       [facility] Telepsych Gad7    Question 2/8/2022  1:47 PM CST - Filed by Patient   Feeling  "nervous, anxious, on edge Not at all   Not being able to stop or control worrying Not at all   Worrying too much about different things Not at all   Trouble relaxing Not at all   Being so restless that its hard to sit still Not at all   Becoming easily annoyed or irritable Not at all   Feeling afraid as if something awful might happen Not at all   TOTAL SCORE: (range: 0 - 21) 0     [facility] Peq Documents    Question 2/8/2022  1:47 PM CST - Filed by Patient   Would you like a copy of [facility]'s Financial Assistance Policy Summary? No, I would not like a copy.   Would you like to receive more information regarding your rights and protections under the [employer] Billing act? No, I would not.     [facility] Telepsych Phq9    Question 2/8/2022  1:48 PM CST - Filed by Patient   By selecting "I understand," you acknowledge that the answers that you provide for this questionnaire may not be immediately viewed by your provider or your care team. If you are personally dealing with suicidal thoughts or a crisis, please consider contacting your provider's office.  If your provider is not available, please consider taking action by: calling 911 or the National Suicide Prevention Lifeline any day, any time at 1-551.295.4159 or texting SIGNS to 637646 for 24/7 anonymous, free crisis counseling. I understand   1. Little interest or pleasure in doing things Not at all   2. Feeling down, depressed, or hopeless Not at all   3. Trouble falling or staying asleep, or sleeping too much Not at all   4. Feeling tired or having little energy Not at all   5. poor appetite or overeating Not at all   6. Feeling bad about yourself - or that you are a failure or have let yourself or your family down Not at all   7. Trouble concentrating on things, such as reading the newspaper or watching television Not at all   8. Moving or speaking so slowly that other people could have noticed? Or the opposite - being so fidgety or restless that you have been " moving around a lot more than usual Not at all   9. Thoughts that you would be better off dead or hurting yourself in some way Not at all   If you indicated that you had ANY of the problems listed in questions 1-9, how difficult have these problems made it for you to do your work, take care of things at home, or get along with other people? Not difficult at all   TOTAL SCORE (range: 0 - 27) 0 (None)         Review of Systems    General : NO chills or fever   Eyes: NO  visual changes   ENT: NO hearing change, nasal discharge or sore throat   Endocrine: NO weight changes or polydipsia/polyuria   Dermatological: NO rashes   Respiratory: NO cough, shortness of breath   Cardiovascular: NO chest pain, palpitations or racing heart   Gastrointestinal: NO nausea, vomiting, constipation or diarrhea   Musculoskeletal: NO muscle pain or stiffness   Neurological: NO confusion, dizziness, headaches or tremors   Psychiatric: please see HPI    Past Medical, Family and Social History: The patient's past medical, family and social history have been reviewed and updated as appropriate within the electronic medical record - see encounter notes.    Compliance: see above    Side effects: see above    Risk Parameters:  Patient reports no suicidal ideation  Patient reports no homicidal ideation  Patient reports no self-injurious behavior  Patient reports no violent behavior    Exam (detailed: at least 9 elements; comprehensive: all 15 elements)     There were no vitals filed for this visit.    Constitutional  Vitals:  Most recent vital signs, dated 5/6/2021, were reviewed.   There were no vitals filed for this visit.     General:  unremarkable, age appropriate, neatly groomed     Musculoskeletal  Muscle Strength/Tone:  not examined   Gait & Station:  [facility]     Psychiatric  Arousal: Alert, awake  Appearance:  fair grooming  Sensorium/Orientation: Oriented to person, place, situation, month and year  Behavior/Cooperation:  "Cooperative, friendly  Psychomotor: No PMA or PMR  Speech: Normal rate, rhythm, prosody and tone  Language: Fluent english  Mood: "Good"  Affect: Reactive, mood congruent  Thought Process: Linear   Thought Content: No SI/HI; no hallucinations or delusions  Associations: Intact  Attention/Concentration: Intact  Memory: Recent and remote intact  Fund of Knowledge: Appropriate for education level   Insight: Fair   Judgment: Appropriate for setting    Lab Visit on 04/26/2021   Component Date Value Ref Range Status    WBC 04/26/2021 5.29  3.9 - 12.7 K/uL Final    RBC 04/26/2021 5.35  4.60 - 6.20 M/uL Final    Hemoglobin 04/26/2021 16.7  14.0 - 18.0 g/dL Final    Hematocrit 04/26/2021 48.0  40.0 - 54.0 % Final    MCV 04/26/2021 90  82.0 - 98.0 fL Final    MCH 04/26/2021 31.2* 27.0 - 31.0 pg Final    MCHC 04/26/2021 34.8  32.0 - 36.0 g/dL Final    RDW 04/26/2021 12.4  11.5 - 14.5 % Final    Platelets 04/26/2021 246  150 - 450 K/uL Final    MPV 04/26/2021 10.9  9.2 - 12.9 fL Final    Sodium 04/26/2021 142  136 - 145 mmol/L Final    Potassium 04/26/2021 4.0  3.5 - 5.1 mmol/L Final    Chloride 04/26/2021 108  95 - 110 mmol/L Final    CO2 04/26/2021 26  23 - 29 mmol/L Final    Glucose 04/26/2021 99  70 - 110 mg/dL Final    BUN 04/26/2021 18  6 - 20 mg/dL Final    Creatinine 04/26/2021 0.9  0.5 - 1.4 mg/dL Final    Calcium 04/26/2021 9.6  8.7 - 10.5 mg/dL Final    Total Protein 04/26/2021 7.4  6.0 - 8.4 g/dL Final    Albumin 04/26/2021 5.0  3.5 - 5.2 g/dL Final    Total Bilirubin 04/26/2021 1.2* 0.1 - 1.0 mg/dL Final    Alkaline Phosphatase 04/26/2021 49* 55 - 135 U/L Final    AST 04/26/2021 16  10 - 40 U/L Final    ALT 04/26/2021 15  10 - 44 U/L Final    Anion Gap 04/26/2021 8  8 - 16 mmol/L Final    eGFR if African American 04/26/2021 >60.0  >60 mL/min/1.73 m^2 Final    eGFR if non African American 04/26/2021 >60.0  >60 mL/min/1.73 m^2 Final    Cholesterol 04/26/2021 112* 120 - 199 mg/dL Final    " Triglycerides 04/26/2021 39  30 - 150 mg/dL Final    HDL 04/26/2021 41  40 - 75 mg/dL Final    LDL Cholesterol 04/26/2021 63.2  63 - 159 mg/dL Final    HDL/Cholesterol Ratio 04/26/2021 36.6  20.0 - 50.0 % Final    Total Cholesterol/HDL Ratio 04/26/2021 2.7  2.0 - 5.0 Final    Non-HDL Cholesterol 04/26/2021 71  mg/dL Final    TSH 04/26/2021 0.976  0.40 - 4.00 uIU/mL Final    Hepatitis C Ab 04/26/2021 Negative  Negative Final    HIV 1/2 Ag/Ab 04/26/2021 Negative  Negative Final       LAPMP    Assessed safety-Reminded of the number and the availability of clinic staff and ER/Hospitals     Assessment and Diagnosis     Status/Progress: Based on the examination today, the patient's problem(s) is/are improving. New problems have not presented today. Co-morbidities are not complicating management of the primary condition. There are not active rule-out diagnoses for this patient at this time.     General Impression: Kiet Paulson, a 20 y.o. male, presenting for medication management follow up visit.    Pt has a previous history of BPD, MDD, RISHI, and Insomnia.      1. Insomnia due to other mental disorder     2. Borderline personality disorder     3. Weight gain due to medication     4. RISHI (generalized anxiety disorder)     5. MDD (major depressive disorder), recurrent episode, mild     6. Tobacco quit date established           Patient is improving and so far is responding well. .          Intervention/Counseling/Treatment Plan   Medical Decision Making:   · Medication Management:   Medications Ordered This Encounter   Medications    ziprasidone (GEODON) 80 MG capsule     Sig: Take 1 capsule (80 mg total) by mouth nightly.     Dispense:  30 capsule     Refill:  2     Please push through medication, dosage change from 40 mg to 80 mg, please fill as soon as possible       · Labs, Diagnostic Studies:            Hemoglobin A1C                  Future, Expected: 2/8/2022, Expires: 4/9/2023, Routine, Lab Collect,  [facility]       · Outside records/collateral information from additional sources:  · Care Coordination: N/a at this time  · Duration of visit: 48 minutes.      Psychotherapy:  · Target symptoms: depression, anxiety , substance abuse, mood swings  · Why chosen therapy is appropriate versus another modality: relevant to diagnosis, patient responds to this modality, evidence based practice  · Outcome monitoring methods: self-report  · Therapeutic intervention type: insight oriented psychotherapy, behavior modifying psychotherapy, supportive psychotherapy  · Topics discussed/themes: building skills sets for symptom management, symptom recognition, substance abuse  · The patient's response to the intervention is accepting. The patient's progress toward treatment goals is good.   · Duration of intervention: 16 minutes.    Discussed diagnosis, risks and benefits of proposed treatment above vs alternative treatments vs no treatment, and potential side effects of these treatments. Patient expresses understanding of the above and displays the capacity to agree with this treatment given said understanding. Patient also agrees that, currently, the benefits outweigh the risks and would like to pursue treatment at this time.     Return to Clinic: 3 months if medication change is effective 1 month or as needed if ineffective depending on level of acuity ---pt to schedule himself      Add on Psychotherapy time: 16 minutes  Length of Visit and chart review: 60 minutes      DONTAE Landry-BC [facility]    This note may have been partially created with Mmodal Natural speaking word recognition program.  There may be word recognition mistakes that are occasionally missed on review.  Please interpret accordingly.

## 2022-05-27 ENCOUNTER — PATIENT MESSAGE (OUTPATIENT)
Dept: FAMILY MEDICINE | Facility: CLINIC | Age: 22
End: 2022-05-27
Payer: COMMERCIAL

## 2022-08-01 ENCOUNTER — TELEPHONE (OUTPATIENT)
Dept: PSYCHIATRY | Facility: CLINIC | Age: 22
End: 2022-08-01
Payer: COMMERCIAL

## 2022-08-02 ENCOUNTER — NURSE TRIAGE (OUTPATIENT)
Dept: ADMINISTRATIVE | Facility: CLINIC | Age: 22
End: 2022-08-02
Payer: COMMERCIAL

## 2022-08-02 RX ORDER — ZIPRASIDONE HYDROCHLORIDE 80 MG/1
80 CAPSULE ORAL NIGHTLY
Qty: 30 CAPSULE | Refills: 0 | Status: SHIPPED | OUTPATIENT
Start: 2022-08-02 | End: 2022-08-18

## 2022-08-02 NOTE — TELEPHONE ENCOUNTER
Pt reports he had called earlier to have a medication refilled, wanting to know if a prescription has been sent, states he never was called back about it, Pt advised that a prescription was seen for Geodon that was sent to [address] at 5pm, Pt denied any further needs and encouraged to call back with any worsening symptoms or questions. Pt verbalized understanding.    Reason for Disposition   Caller has medicine question only, adult not sick, AND triager answers question    Protocols used: MEDICATION QUESTION CALL-A-AH

## 2022-08-02 NOTE — TELEPHONE ENCOUNTER
Patient is scheduled with Dr. Snell on 8/18/22  He is a transfer patient from [place]  He is out of his Geodon and starting to have withdrawals.  I don't know if she is refill meds before the appointment.

## 2022-08-18 ENCOUNTER — OFFICE VISIT (OUTPATIENT)
Dept: PSYCHIATRY | Facility: CLINIC | Age: 22
End: 2022-08-18
Payer: COMMERCIAL

## 2022-08-18 VITALS
HEART RATE: 76 BPM | BODY MASS INDEX: 29.69 KG/M2 | DIASTOLIC BLOOD PRESSURE: 79 MMHG | WEIGHT: 189.63 LBS | SYSTOLIC BLOOD PRESSURE: 127 MMHG

## 2022-08-18 DIAGNOSIS — F51.05 INSOMNIA DUE TO OTHER MENTAL DISORDER: ICD-10-CM

## 2022-08-18 DIAGNOSIS — F31.75 BIPOLAR DISORDER, IN PARTIAL REMISSION, MOST RECENT EPISODE DEPRESSED: Primary | ICD-10-CM

## 2022-08-18 DIAGNOSIS — F99 INSOMNIA DUE TO OTHER MENTAL DISORDER: ICD-10-CM

## 2022-08-18 PROBLEM — F41.1 GAD (GENERALIZED ANXIETY DISORDER): Status: RESOLVED | Noted: 2021-05-18 | Resolved: 2022-08-18

## 2022-08-18 PROBLEM — F12.10 MILD CANNABIS USE DISORDER: Status: RESOLVED | Noted: 2021-05-18 | Resolved: 2022-08-18

## 2022-08-18 PROCEDURE — 3008F PR BODY MASS INDEX (BMI) DOCUMENTED: ICD-10-PCS | Mod: CPTII,S$GLB,, | Performed by: PSYCHOLOGIST

## 2022-08-18 PROCEDURE — 99215 PR OFFICE/OUTPT VISIT, EST, LEVL V, 40-54 MIN: ICD-10-PCS | Mod: S$GLB,,, | Performed by: PSYCHOLOGIST

## 2022-08-18 PROCEDURE — 99999 PR PBB SHADOW E&M-EST. PATIENT-LVL II: ICD-10-PCS | Mod: PBBFAC,,, | Performed by: PSYCHOLOGIST

## 2022-08-18 PROCEDURE — 99999 PR PBB SHADOW E&M-EST. PATIENT-LVL II: CPT | Mod: PBBFAC,,, | Performed by: PSYCHOLOGIST

## 2022-08-18 PROCEDURE — 3044F HG A1C LEVEL LT 7.0%: CPT | Mod: CPTII,S$GLB,, | Performed by: PSYCHOLOGIST

## 2022-08-18 PROCEDURE — 3008F BODY MASS INDEX DOCD: CPT | Mod: CPTII,S$GLB,, | Performed by: PSYCHOLOGIST

## 2022-08-18 PROCEDURE — 3078F PR MOST RECENT DIASTOLIC BLOOD PRESSURE < 80 MM HG: ICD-10-PCS | Mod: CPTII,S$GLB,, | Performed by: PSYCHOLOGIST

## 2022-08-18 PROCEDURE — 3074F PR MOST RECENT SYSTOLIC BLOOD PRESSURE < 130 MM HG: ICD-10-PCS | Mod: CPTII,S$GLB,, | Performed by: PSYCHOLOGIST

## 2022-08-18 PROCEDURE — 1159F PR MEDICATION LIST DOCUMENTED IN MEDICAL RECORD: ICD-10-PCS | Mod: CPTII,S$GLB,, | Performed by: PSYCHOLOGIST

## 2022-08-18 PROCEDURE — 1159F MED LIST DOCD IN RCRD: CPT | Mod: CPTII,S$GLB,, | Performed by: PSYCHOLOGIST

## 2022-08-18 PROCEDURE — 3044F PR MOST RECENT HEMOGLOBIN A1C LEVEL <7.0%: ICD-10-PCS | Mod: CPTII,S$GLB,, | Performed by: PSYCHOLOGIST

## 2022-08-18 PROCEDURE — 3074F SYST BP LT 130 MM HG: CPT | Mod: CPTII,S$GLB,, | Performed by: PSYCHOLOGIST

## 2022-08-18 PROCEDURE — 99215 OFFICE O/P EST HI 40 MIN: CPT | Mod: S$GLB,,, | Performed by: PSYCHOLOGIST

## 2022-08-18 PROCEDURE — 3078F DIAST BP <80 MM HG: CPT | Mod: CPTII,S$GLB,, | Performed by: PSYCHOLOGIST

## 2022-08-18 RX ORDER — QUETIAPINE FUMARATE 50 MG/1
50 TABLET, FILM COATED ORAL NIGHTLY
Qty: 30 TABLET | Refills: 2 | Status: SHIPPED | OUTPATIENT
Start: 2022-08-18 | End: 2022-09-15

## 2022-08-18 RX ORDER — SERTRALINE HYDROCHLORIDE 50 MG/1
50 TABLET, FILM COATED ORAL DAILY
Qty: 30 TABLET | Refills: 2 | Status: SHIPPED | OUTPATIENT
Start: 2022-08-18 | End: 2022-09-15

## 2022-08-18 RX ORDER — ZIPRASIDONE HYDROCHLORIDE 20 MG/1
20 CAPSULE ORAL NIGHTLY
Qty: 30 CAPSULE | Refills: 0 | Status: SHIPPED | OUTPATIENT
Start: 2022-08-18 | End: 2022-09-15 | Stop reason: ALTCHOICE

## 2022-08-18 NOTE — PROGRESS NOTES
PSYCHIATRIC EVALUATION     Disclaimer: Evaluation and treatment is based on information presented to date. Any new information may affect assessment and findings.     Name: Kiet aPulson  Age: 21 y.o.  : 2000    Preferred Name: Kiet    Referring provider: Dr. Thelma Camarillo    Chief Complaint:  Insomnia    History of Present Illness:   Pt reports that his mood has been stable; he is not currently depressed while taking Zoloft 50 mg qd. However, he continues to experience onset and middle insomnia while taking Geodon 80 mg qhs (as prescribed for the past 4 months) and requests to resume Seroquel. Pt reports previously being diagnosed with Borderline PD, but when assessed presently, pt endorsed on 1) past NSSI (cutting); inactive for the past 6 years; and 2) unstable relationships in the past but has been with fiance for 2 years and have a 2 y.o. son together; pt is happy in relationship. Pt has had at least 2 major depressive episodes in the past--the first in his senior year of HS, and the second in 2018 when he discontinued his psychiatric medications abruptly. During the episodes, he experienced SI, hoplessness, feelings of worthlessness, decreased interest, fatigue, sad mood, insomnia, and frequent crying. Pt overdosed in  while abusing opioid pain medications and was subsequently hospitalized in a psychiatric setting and diagnosed with depression and Borderline PD. Pt has abstained from opioid use since that time. He took psychiatric medication from  to  and reports he was stable throughout that time. He discontinued medications on his own and experienced a second depressive episode in 2018 and has been stable on Zoloft 50 mg qd and Seroquel 50 mg qhs (changed to Geodon 80 mg qhs 4 months ago) since that time. Pt endorses daily use of marijuana until the past year during which he has been abstinent. He reports being motivated for positive behavioral change since the birth of his son. Pt does  endorse past symptoms consistent with a manic/hypomanic episode, including racing thoughts, inflated self-esteem, increased energy, decreased need for sleep, and poor decision-making/impulsivity. Hs past NSSI and unstable relationships are better explained by Bipolar Disorder than Borderline PD.     ADHD: denied   Depressive Disorder: At least 2 prior depressive episodes; currently only occasional feelings of worthlessness   Anxiety Disorder: denied   Panic Disorder: denied   Manic Disorder: expansive mood, decreased need for sleep, racing thoughts   Psychotic Disorder: denied   Substance Use:  Marijuana: Has abstained x 1 year; former daily us and Opioids: Overdose in 2015; abstinent since 2016     GAD7 4/19/2022 2/8/2022 6/28/2021   1. Feeling nervous, anxious, or on edge? 0 0 0   2. Not being able to stop or control worrying? 0 0 0   3. Worrying too much about different things? 0 0 0   4. Trouble relaxing? 0 0 0   5. Being so restless that it is hard to sit still? 0 0 0   6. Becoming easily annoyed or irritable? 0 0 0   7. Feeling afraid as if something awful might happen? 0 0 0   8. If you checked off any problems, how difficult have these problems made it for you to do your work, take care of things at home, or get along with other people? - - 0   RISHI-7 Score 0 0 0     No flowsheet data found.  No flowsheet data found.  MDQ Scale 5/6/2021   you felt so good or so hyper that other people thought you were not your normal self or you were so hyper that you got into trouble? 0   you were so irritable that you shouted at people or started fights or arguments? 1   you felt much more self-confident than usual? 1   you got much less sleep than usual and found that you didn't really miss it? 1   you were more talkative or spoke much faster than usual? 1   thoughts raced through your head or you couldn't slow your mind down? 1   you were so easily distracted by things around you that you had trouble concentrating or  staying on track? 1   you had more energy than usual? 1   you were much more active or did many more things than usual? 1   you were much more social or outgoing than usual, for example, you telephoned friends in the middle of the night? 1   you were much more interested in sex than usual? 1   you did things that were unusual for you or that other people might have thought were excessive, foolish, or risky? 1   spending money got you or your family in trouble? 1   If you checked YES to more than one of the above, have several of these ever happened during the same period of time? 1   How much of a problem did any of these cause you - like being unable to work; having family, money or legal troubles; getting into arguments or fights? Moderate problem   Mood Disorder Questionnaire Score  12       Review Of Systems: Review of Systems   Constitutional: Positive for fatigue. Negative for activity change, appetite change and unexpected weight change.   Respiratory: Negative for shortness of breath.    Psychiatric/Behavioral: Positive for sleep disturbance. Negative for agitation, behavioral problems, confusion, decreased concentration, dysphoric mood, hallucinations, self-injury and suicidal ideas. The patient is not nervous/anxious and is not hyperactive.         Nutritional Screening: Considering the patient's height and weight, medications, medical history and preferences, should a referral be made to the dietitian? no    Constitutional:    Vitals:  Most recent vital signs were reviewed.   Last 3 sets of Vitals    Vitals - 1 value per visit 4/26/2021 4/26/2021 8/18/2022   SYSTOLIC - 101 127   DIASTOLIC - 60 79   Pulse - 68 76   Temp - 97.8 -   Weight (lb) - 142 189.6   Weight (kg) - 64.411 86   Height - 67 -   BMI (Calculated) - 22.2 -   VISIT REPORT - - -   Pain Score  0 - -            Psychiatric:  Oriented: x 3 / including: Date: 8/18/22; and aware meeting with [facility] [place] [place]   Attitude: cooperative   Eye  "Contact: good   Behavior: wnl   Mood: "good"  Affect: appropriate range   Attention: intact   Concentration: grossly intact   Thought Process: goal directed   Speech: intelligible  Volume: WNL   Quantity: WNL   Rhythm: WNL  Insight: fair to good   Threats: no SI / HI   Memory: Grossly intact  Psychosis: denies all   Estimate of Intellectual Function: average   Judgment (to simple situation): intact   Relevant Elements of Neurological Exam: normal gait     Medical history:   Past Medical History:   Diagnosis Date    Anxiety     Borderline personality disorder     Depression     RISHI (generalized anxiety disorder) 5/18/2021    Mild cannabis use disorder 5/18/2021        Family History:  Family History   Problem Relation Age of Onset    Heart failure Father     Hypertension Father     Diabetes Maternal Grandmother     Heart failure Paternal Grandfather     Insulin resistance Mother         Family history of psychiatric illness: Not known    PSYCHO-SOCIAL DEVELOPMENT HISTORY:   Social History     Socioeconomic History    Marital status: Significant Other    Number of children: 1   Occupational History    Occupation: industrial plant deliveries   Tobacco Use    Smoking status: Former Smoker     Types: Cigarettes    Smokeless tobacco: Never Used   Substance and Sexual Activity    Alcohol use: Not Currently    Drug use: Not Currently     Comment: past amphetamine and opioid abuse, reported quitting late 2016        Allergy Review:   Review of patient's allergies indicates:   Allergen Reactions    Anesthetics - amide type - select amino amides     Cephalosporins         Medical Problem List:   Patient Active Problem List   Diagnosis    Borderline personality disorder    Moderate episode of recurrent major depressive disorder    Insomnia due to other mental disorder    Severe episode of recurrent major depressive disorder, without psychotic features    Tobacco quit date established        Encounter " Diagnoses   Name Primary?    Bipolar disorder, in partial remission, most recent episode depressed Yes    Insomnia due to other mental disorder         IMPRESSIONS/PLAN  Pt meets diagnostic criteria for Bipolar Disorder; Does not meet diagnostic criteria for Borderline PD as previous eval indicates    · Medication Management: Discussed risks, benefits, and alternatives to treatment plan documented above with patient. I answered all patient questions related to this plan, and patient expressed understanding and agreement.  Discussed with pt tapering down to Geodon 20 mg qhs for at least one week while starting Seroquel 50 mg qhs.    Follow up in about 4 weeks (around 9/15/2022) for Medication follow up.     Medication List with Changes/Refills   New Medications    QUETIAPINE (SEROQUEL) 50 MG TABLET    Take 1 tablet (50 mg total) by mouth every evening.   Changed and/or Refilled Medications    Modified Medication Previous Medication    SERTRALINE (ZOLOFT) 50 MG TABLET sertraline (ZOLOFT) 50 MG tablet       Take 1 tablet (50 mg total) by mouth once daily.    Take 1 tablet (50 mg total) by mouth once daily.    ZIPRASIDONE (GEODON) 20 MG CAP ziprasidone (GEODON) 80 MG capsule       Take 1 capsule (20 mg total) by mouth nightly.    Take 1 capsule (80 mg total) by mouth nightly.        Time spent with pt including note preparation: 60 minutes     Holley Snell, PhD, MP  Medical Psychologist

## 2022-08-26 ENCOUNTER — PATIENT OUTREACH (OUTPATIENT)
Dept: ADMINISTRATIVE | Facility: HOSPITAL | Age: 22
End: 2022-08-26
Payer: COMMERCIAL

## 2022-09-15 ENCOUNTER — OFFICE VISIT (OUTPATIENT)
Dept: PSYCHIATRY | Facility: CLINIC | Age: 22
End: 2022-09-15
Payer: COMMERCIAL

## 2022-09-15 VITALS — HEART RATE: 89 BPM | DIASTOLIC BLOOD PRESSURE: 76 MMHG | SYSTOLIC BLOOD PRESSURE: 120 MMHG

## 2022-09-15 DIAGNOSIS — F31.75 BIPOLAR DISORDER, IN PARTIAL REMISSION, MOST RECENT EPISODE DEPRESSED: ICD-10-CM

## 2022-09-15 PROCEDURE — 99215 PR OFFICE/OUTPT VISIT, EST, LEVL V, 40-54 MIN: ICD-10-PCS | Mod: S$GLB,,, | Performed by: PSYCHOLOGIST

## 2022-09-15 PROCEDURE — 1159F MED LIST DOCD IN RCRD: CPT | Mod: CPTII,S$GLB,, | Performed by: PSYCHOLOGIST

## 2022-09-15 PROCEDURE — 3078F PR MOST RECENT DIASTOLIC BLOOD PRESSURE < 80 MM HG: ICD-10-PCS | Mod: CPTII,S$GLB,, | Performed by: PSYCHOLOGIST

## 2022-09-15 PROCEDURE — 99215 OFFICE O/P EST HI 40 MIN: CPT | Mod: S$GLB,,, | Performed by: PSYCHOLOGIST

## 2022-09-15 PROCEDURE — 3044F PR MOST RECENT HEMOGLOBIN A1C LEVEL <7.0%: ICD-10-PCS | Mod: CPTII,S$GLB,, | Performed by: PSYCHOLOGIST

## 2022-09-15 PROCEDURE — 3044F HG A1C LEVEL LT 7.0%: CPT | Mod: CPTII,S$GLB,, | Performed by: PSYCHOLOGIST

## 2022-09-15 PROCEDURE — 1159F PR MEDICATION LIST DOCUMENTED IN MEDICAL RECORD: ICD-10-PCS | Mod: CPTII,S$GLB,, | Performed by: PSYCHOLOGIST

## 2022-09-15 PROCEDURE — 99999 PR PBB SHADOW E&M-EST. PATIENT-LVL II: ICD-10-PCS | Mod: PBBFAC,,, | Performed by: PSYCHOLOGIST

## 2022-09-15 PROCEDURE — 99999 PR PBB SHADOW E&M-EST. PATIENT-LVL II: CPT | Mod: PBBFAC,,, | Performed by: PSYCHOLOGIST

## 2022-09-15 PROCEDURE — 3078F DIAST BP <80 MM HG: CPT | Mod: CPTII,S$GLB,, | Performed by: PSYCHOLOGIST

## 2022-09-15 PROCEDURE — 3074F PR MOST RECENT SYSTOLIC BLOOD PRESSURE < 130 MM HG: ICD-10-PCS | Mod: CPTII,S$GLB,, | Performed by: PSYCHOLOGIST

## 2022-09-15 PROCEDURE — 3074F SYST BP LT 130 MM HG: CPT | Mod: CPTII,S$GLB,, | Performed by: PSYCHOLOGIST

## 2022-09-15 RX ORDER — SERTRALINE HYDROCHLORIDE 50 MG/1
50 TABLET, FILM COATED ORAL DAILY
Qty: 90 TABLET | Refills: 2 | Status: SHIPPED | OUTPATIENT
Start: 2022-09-15 | End: 2022-12-08 | Stop reason: SDUPTHER

## 2022-09-15 RX ORDER — QUETIAPINE FUMARATE 100 MG/1
100 TABLET, FILM COATED ORAL NIGHTLY
Qty: 90 TABLET | Refills: 2 | Status: SHIPPED | OUTPATIENT
Start: 2022-09-15 | End: 2022-12-08 | Stop reason: SDUPTHER

## 2022-09-15 NOTE — PROGRESS NOTES
MEDICATION MANAGEMENT SESSION    Name: Kiet Paulson  Age: 22 y.o.  : 2000    Preferred Name: Kiet    Referring provider: Dr. Thelma Camarillo    Reason for Visit:  Medication follow up    Summary of Visit:  Pt reports that he has done very well since last visit with transition from Geodon 80 mg qhs to Seroquel 50 mg qhs. He reports he had a 2 days in which he experienced anxiety, fatigue, and irritability when he had tapered completely off Geodon 20 mg qhs, but after that he felt better and has continually progressed from that time. He reports no longer feeling fatigued as he did while taking Geodon, and he feels clear-headed and able to focus at work and still have energy to spend time with his son when he gets home in the evening. Pt also reports that his mood has been stable with no depression symptoms and no hypomanic/manic symptoms since last visit and during the medication change. Pt discussed his previous dose was Seorquel 100 mg qhs for mood stabilization. Pt continues to do well with Zoloft 50 mg qd.    Current Symptoms:   ADHD: denied   Depressive Disorder: denied   Anxiety Disorder: denied   Panic Disorder: denied   Manic Disorder: denied   Psychotic Disorder: denied   Substance Use:  denied     GAD7 2022   1. Feeling nervous, anxious, or on edge? 0 0 0   2. Not being able to stop or control worrying? 0 0 0   3. Worrying too much about different things? 0 0 0   4. Trouble relaxing? 0 0 0   5. Being so restless that it is hard to sit still? 0 0 0   6. Becoming easily annoyed or irritable? 0 0 0   7. Feeling afraid as if something awful might happen? 0 0 0   8. If you checked off any problems, how difficult have these problems made it for you to do your work, take care of things at home, or get along with other people? - - 0   RISHI-7 Score 0 0 0     MDQ Scale 2021   you felt so good or so hyper that other people thought you were not your normal self or you were so hyper  that you got into trouble? 0   you were so irritable that you shouted at people or started fights or arguments? 1   you felt much more self-confident than usual? 1   you got much less sleep than usual and found that you didn't really miss it? 1   you were more talkative or spoke much faster than usual? 1   thoughts raced through your head or you couldn't slow your mind down? 1   you were so easily distracted by things around you that you had trouble concentrating or staying on track? 1   you had more energy than usual? 1   you were much more active or did many more things than usual? 1   you were much more social or outgoing than usual, for example, you telephoned friends in the middle of the night? 1   you were much more interested in sex than usual? 1   you did things that were unusual for you or that other people might have thought were excessive, foolish, or risky? 1   spending money got you or your family in trouble? 1   If you checked YES to more than one of the above, have several of these ever happened during the same period of time? 1   How much of a problem did any of these cause you - like being unable to work; having family, money or legal troubles; getting into arguments or fights? Moderate problem   Mood Disorder Questionnaire Score  12     Review of Systems   Constitutional:  Negative for activity change, appetite change, fatigue and unexpected weight change.   Respiratory:  Negative for shortness of breath.    Psychiatric/Behavioral:  Negative for agitation, behavioral problems, confusion, decreased concentration, dysphoric mood, hallucinations, self-injury, sleep disturbance and suicidal ideas. The patient is not nervous/anxious and is not hyperactive.       Constitutional:  Vitals:  Most recent vital signs were reviewed.   Last 3 sets of Vitals    Vitals - 1 value per visit 4/26/2021 8/18/2022 9/15/2022   SYSTOLIC 101 127 120   DIASTOLIC 60 79 76   Pulse 68 76 89   Temp 97.8 - -   Weight (lb) 142  "189.6 -   Weight (kg) 64.411 86 -   Height 67 - -   BMI (Calculated) 22.2 - -   VISIT REPORT - - -   Pain Score  - - -          Psychiatric:  Oriented: x 3  Attitude: cooperative and pleasant  Eye Contact: good   Behavior: wnl   Mood: "good"  Affect: appropriate range   Attention: intact   Concentration: grossly intact   Thought Process: goal directed   Speech: intelligible  Volume: WNL   Quantity: WNL   Rhythm: WNL  Insight: fair to good   Threats: no SI / HI   Memory: Intact  Psychosis: denies all   Estimate of Intellectual Function: average   Judgment: good  Relevant Elements of Neurological Exam: normal gait      Allergy Review:   Review of patient's allergies indicates:   Allergen Reactions    Anesthetics - amide type - select amino amides     Cephalosporins       Medical Problem List:   Patient Active Problem List   Diagnosis    Borderline personality disorder    Moderate episode of recurrent major depressive disorder    Insomnia due to other mental disorder    Severe episode of recurrent major depressive disorder, without psychotic features    Tobacco quit date established      Encounter Diagnosis   Name Primary?    Bipolar disorder, in partial remission, most recent episode depressed      PLAN:  Medication Management: Continue current medications. Discussed risks, benefits, and alternatives to treatment plan documented above with patient. I answered all patient questions related to this plan, and patient expressed understanding and agreement.      Follow up in about 3 months (around 12/15/2022) for Medication follow up.     Medication List with Changes/Refills   Changed and/or Refilled Medications    Modified Medication Previous Medication    QUETIAPINE (SEROQUEL) 100 MG TAB QUEtiapine (SEROQUEL) 50 MG tablet       Take 1 tablet (100 mg total) by mouth every evening.    Take 1 tablet (50 mg total) by mouth every evening.    SERTRALINE (ZOLOFT) 50 MG TABLET sertraline (ZOLOFT) 50 MG tablet       Take 1 tablet " (50 mg total) by mouth once daily.    Take 1 tablet (50 mg total) by mouth once daily.   Discontinued Medications    ZIPRASIDONE (GEODON) 20 MG CAP    Take 1 capsule (20 mg total) by mouth nightly.        Time spent with pt including note preparation: 45 minutes     Holley Snell, PhD, MP  Medical Psychologist

## 2022-12-15 ENCOUNTER — OFFICE VISIT (OUTPATIENT)
Dept: PSYCHIATRY | Facility: CLINIC | Age: 22
End: 2022-12-15
Payer: COMMERCIAL

## 2022-12-15 VITALS — SYSTOLIC BLOOD PRESSURE: 113 MMHG | DIASTOLIC BLOOD PRESSURE: 70 MMHG | HEART RATE: 96 BPM

## 2022-12-15 DIAGNOSIS — F99 INSOMNIA DUE TO OTHER MENTAL DISORDER: ICD-10-CM

## 2022-12-15 DIAGNOSIS — F51.05 INSOMNIA DUE TO OTHER MENTAL DISORDER: ICD-10-CM

## 2022-12-15 DIAGNOSIS — F41.1 GAD (GENERALIZED ANXIETY DISORDER): ICD-10-CM

## 2022-12-15 DIAGNOSIS — F31.75 BIPOLAR DISORDER, IN PARTIAL REMISSION, MOST RECENT EPISODE DEPRESSED: Primary | ICD-10-CM

## 2022-12-15 PROCEDURE — 1159F MED LIST DOCD IN RCRD: CPT | Mod: CPTII,S$GLB,, | Performed by: PSYCHOLOGIST

## 2022-12-15 PROCEDURE — 3078F DIAST BP <80 MM HG: CPT | Mod: CPTII,S$GLB,, | Performed by: PSYCHOLOGIST

## 2022-12-15 PROCEDURE — 99999 PR PBB SHADOW E&M-EST. PATIENT-LVL II: ICD-10-PCS | Mod: PBBFAC,,, | Performed by: PSYCHOLOGIST

## 2022-12-15 PROCEDURE — 3074F PR MOST RECENT SYSTOLIC BLOOD PRESSURE < 130 MM HG: ICD-10-PCS | Mod: CPTII,S$GLB,, | Performed by: PSYCHOLOGIST

## 2022-12-15 PROCEDURE — 3074F SYST BP LT 130 MM HG: CPT | Mod: CPTII,S$GLB,, | Performed by: PSYCHOLOGIST

## 2022-12-15 PROCEDURE — 1159F PR MEDICATION LIST DOCUMENTED IN MEDICAL RECORD: ICD-10-PCS | Mod: CPTII,S$GLB,, | Performed by: PSYCHOLOGIST

## 2022-12-15 PROCEDURE — 99214 PR OFFICE/OUTPT VISIT, EST, LEVL IV, 30-39 MIN: ICD-10-PCS | Mod: S$GLB,,, | Performed by: PSYCHOLOGIST

## 2022-12-15 PROCEDURE — 99999 PR PBB SHADOW E&M-EST. PATIENT-LVL II: CPT | Mod: PBBFAC,,, | Performed by: PSYCHOLOGIST

## 2022-12-15 PROCEDURE — 3078F PR MOST RECENT DIASTOLIC BLOOD PRESSURE < 80 MM HG: ICD-10-PCS | Mod: CPTII,S$GLB,, | Performed by: PSYCHOLOGIST

## 2022-12-15 PROCEDURE — 99214 OFFICE O/P EST MOD 30 MIN: CPT | Mod: S$GLB,,, | Performed by: PSYCHOLOGIST

## 2022-12-15 RX ORDER — QUETIAPINE FUMARATE 100 MG/1
100 TABLET, FILM COATED ORAL NIGHTLY
Qty: 90 TABLET | Refills: 2 | Status: SHIPPED | OUTPATIENT
Start: 2022-12-15 | End: 2023-05-29 | Stop reason: SDUPTHER

## 2022-12-15 RX ORDER — SERTRALINE HYDROCHLORIDE 50 MG/1
50 TABLET, FILM COATED ORAL DAILY
Qty: 90 TABLET | Refills: 2 | Status: SHIPPED | OUTPATIENT
Start: 2022-12-15 | End: 2023-05-29 | Stop reason: SDUPTHER

## 2022-12-15 NOTE — PROGRESS NOTES
MEDICATION MANAGEMENT SESSION    Name: Kiet Paulson  Age: 22 y.o.  : 2000    Preferred Name: Kiet    Referring provider: Dr. Thelma Camarillo    Reason for Visit:  Medication follow up    Summary of Visit:  Pt reports he has been doing well since last visit and increase to Seroquel 100 mg qhs. He states that his mood has been stable, he has been staying calmer, and has been dealing well with stressful situations that continue to occur (e.g., car trouble, school and work pressures). Pt has noticed improvement in his ability to remain stable despite encountering stressors/challenges.     Current Symptoms:    ADHD: denied   Depressive Disorder: tired/fatigued   Anxiety Disorder: anxiety/nervousness   Panic Disorder: denied   Manic Disorder: denied   Psychotic Disorder: denied   Substance Use:  denied     GAD7 2022   1. Feeling nervous, anxious, or on edge? 0 0 0   2. Not being able to stop or control worrying? 0 0 0   3. Worrying too much about different things? 0 0 0   4. Trouble relaxing? 0 0 0   5. Being so restless that it is hard to sit still? 0 0 0   6. Becoming easily annoyed or irritable? 0 0 0   7. Feeling afraid as if something awful might happen? 0 0 0   8. If you checked off any problems, how difficult have these problems made it for you to do your work, take care of things at home, or get along with other people? - - 0   RISHI-7 Score 0 0 0     Review of Systems   Constitutional:  Positive for fatigue. Negative for activity change, appetite change and unexpected weight change.   Respiratory:  Negative for shortness of breath.    Psychiatric/Behavioral:  Negative for agitation, behavioral problems, confusion, decreased concentration, dysphoric mood, hallucinations, self-injury, sleep disturbance and suicidal ideas. The patient is not nervous/anxious and is not hyperactive.       Constitutional:  Vitals:  Most recent vital signs were reviewed.   Last 3 sets of Vitals    Vitals -  "1 value per visit 8/18/2022 9/15/2022 12/15/2022   SYSTOLIC 127 120 113   DIASTOLIC 79 76 70   Pulse 76 89 96   Temp - - -   Weight (lb) 189.6 - -   Weight (kg) 86 - -   Height - - -   BMI (Calculated) - - -   VISIT REPORT - - -   Pain Score  - - -          Psychiatric:  Oriented: x 3   Attitude: cooperative   Eye Contact: good   Behavior: wnl   Mood: "good"  Affect: appropriate range   Attention: intact   Concentration: grossly intact   Thought Process: goal directed   Speech: intelligible  Volume: WNL   Quantity: WNL   Rhythm: WNL  Insight: fair to good   Threats: no SI / HI   Memory: Grossly intact  Psychosis: denies all   Estimate of Intellectual Function: average   Judgment: good    Relevant Elements of Neurological Exam: normal gait     Allergy Review:   Review of patient's allergies indicates:   Allergen Reactions    Anesthetics - amide type - select amino amides     Cephalosporins       Medical Problem List:   Patient Active Problem List   Diagnosis    Borderline personality disorder    Moderate episode of recurrent major depressive disorder    Insomnia due to other mental disorder    Severe episode of recurrent major depressive disorder, without psychotic features    Tobacco quit date established      Encounter Diagnoses   Name Primary?    Bipolar disorder, in partial remission, most recent episode depressed Yes    RISHI (generalized anxiety disorder)     Insomnia due to other mental disorder       PLAN:  Medication Management: Continue current medications.    Follow up in about 6 months (around 6/15/2023) for Medication follow up.     Medication List with Changes/Refills   Changed and/or Refilled Medications    Modified Medication Previous Medication    QUETIAPINE (SEROQUEL) 100 MG TAB QUEtiapine (SEROQUEL) 100 MG Tab       Take 1 tablet (100 mg total) by mouth every evening.    Take 1 tablet (100 mg total) by mouth every evening.    SERTRALINE (ZOLOFT) 50 MG TABLET sertraline (ZOLOFT) 50 MG tablet       " Take 1 tablet (50 mg total) by mouth once daily.    Take 1 tablet (50 mg total) by mouth once daily.      Time spent with pt including note preparation: 40 minutes     Holley Snell, PhD, MP  Medical Psychologist

## 2023-05-29 DIAGNOSIS — F31.75 BIPOLAR DISORDER, IN PARTIAL REMISSION, MOST RECENT EPISODE DEPRESSED: ICD-10-CM

## 2023-05-29 RX ORDER — QUETIAPINE FUMARATE 100 MG/1
100 TABLET, FILM COATED ORAL NIGHTLY
Qty: 90 TABLET | Refills: 2 | Status: SHIPPED | OUTPATIENT
Start: 2023-05-29 | End: 2024-01-12 | Stop reason: SDUPTHER

## 2023-05-29 RX ORDER — SERTRALINE HYDROCHLORIDE 50 MG/1
50 TABLET, FILM COATED ORAL DAILY
Qty: 90 TABLET | Refills: 2 | Status: SHIPPED | OUTPATIENT
Start: 2023-05-29 | End: 2024-01-12 | Stop reason: SDUPTHER

## 2023-06-28 NOTE — PROGRESS NOTES
"MEDICATION MANAGEMENT SESSION    Name: Kiet Paulson  Age: 22 y.o.  : 2000    Preferred Name: Kiet    Referring provider: Dr. Thelma Camarillo    Reason for Visit:  Medication follow up    Summary of Visit:  Pt reports he has continued to do well with Seroquel 100 mg qhs and Zoloft 50 mg qd and reports his mood has been stable for the past 6 months. He describes a day 4-5 months ago when he felt depressed/down, felt "burnt out," exhausted, and wanted to stay home in bed but felt fine the next day after resting and realizes now it was due to managing work stress and school pressures, and he needed a break. He has been sleeping well, his energy level is good; he has not experienced hypomanic/manic symptoms since last visit. He reports anxiety has not been a problem. He is at a new job in [employer] for [employer] and now leaves work at 3:00 p.m. so he takes his son to the park each afternoon and has moved his family to a larger home. His son just turned 3; he and his fiancee are planning their wedding for 2024. Pt graduates next month with his associate's Degree, has a brief break, then resumes school to work on his bachelor's in IT/computer science. He plans to also earn his master's once he completes his BA.     Current Symptoms:   ADHD: denied   Depressive Disorder: One day 4-5 months ago felt down and exhausted, but better the next day after resting and taking day off.   Anxiety Disorder: denied   Panic Disorder: denied   Manic Disorder: denied   Psychotic Disorder: denied   Substance Use:  denied     Review of Systems   Constitutional:  Negative for activity change, appetite change, fatigue and unexpected weight change.   Respiratory:  Negative for shortness of breath.    Psychiatric/Behavioral:  Negative for agitation, behavioral problems, confusion, decreased concentration, dysphoric mood, hallucinations, self-injury, sleep disturbance and suicidal ideas. The patient is not " "nervous/anxious and is not hyperactive.       Constitutional:  Vitals:  Most recent vital signs were reviewed.   Last 3 sets of Vitals    Vitals - 1 value per visit 9/15/2022 12/15/2022 6/29/2023   SYSTOLIC 120 113 125   DIASTOLIC 76 70 74   Pulse 89 96 74   Temp - - -   Weight (lb) - - 201.28   Weight (kg) - - 91.3   Height - - -   BMI (Calculated) - - -   VISIT REPORT - - -   Pain Score  - - -          Psychiatric:  Oriented: x 3   Attitude: cooperative   Eye Contact: good   Behavior: wnl   Mood: "very good"  Affect: appropriate range   Attention: intact   Concentration: grossly intact   Thought Process: goal directed   Speech: intelligible  Volume: WNL   Quantity: WNL   Rhythm: WNL  Insight: fair to good   Threats: no SI / HI   Memory: Grossly intact  Psychosis: denies all   Estimate of Intellectual Function: average   Judgment: fair to good  Relevant Elements of Neurological Exam: normal gait     Allergy Review:   Review of patient's allergies indicates:   Allergen Reactions    Anesthetics - amide type - select amino amides     Cephalosporins       Medical Problem List:   Patient Active Problem List   Diagnosis    Borderline personality disorder    Moderate episode of recurrent major depressive disorder    Insomnia due to other mental disorder    Severe episode of recurrent major depressive disorder, without psychotic features    Tobacco quit date established      Encounter Diagnoses   Name Primary?    Bipolar disorder, in partial remission, most recent episode depressed Yes    RISHI (generalized anxiety disorder)     Severe episode of recurrent major depressive disorder, without psychotic features     Insomnia due to other mental disorder     Borderline personality disorder       PLAN:  Medication Management: Continue current medications. Discussed risks, benefits, and alternatives to treatment plan documented above with patient. I answered all patient questions related to this plan, and patient expressed " understanding and agreement.      Follow up in about 6 months (around 12/29/2023) for Medication follow up.     Medication List with Changes/Refills   Current Medications    QUETIAPINE (SEROQUEL) 100 MG TAB    Take 1 tablet (100 mg total) by mouth every evening.    SERTRALINE (ZOLOFT) 50 MG TABLET    Take 1 tablet (50 mg total) by mouth once daily.      Time spent with pt including note preparation: 30 minutes     Holley Snell, PhD, MP  Medical Psychologist

## 2023-06-29 ENCOUNTER — OFFICE VISIT (OUTPATIENT)
Dept: PSYCHIATRY | Facility: CLINIC | Age: 23
End: 2023-06-29
Payer: COMMERCIAL

## 2023-06-29 VITALS
WEIGHT: 201.25 LBS | DIASTOLIC BLOOD PRESSURE: 74 MMHG | SYSTOLIC BLOOD PRESSURE: 125 MMHG | HEART RATE: 74 BPM | BODY MASS INDEX: 31.52 KG/M2

## 2023-06-29 DIAGNOSIS — F51.05 INSOMNIA DUE TO OTHER MENTAL DISORDER: ICD-10-CM

## 2023-06-29 DIAGNOSIS — F41.1 GAD (GENERALIZED ANXIETY DISORDER): ICD-10-CM

## 2023-06-29 DIAGNOSIS — F60.3 BORDERLINE PERSONALITY DISORDER: ICD-10-CM

## 2023-06-29 DIAGNOSIS — F99 INSOMNIA DUE TO OTHER MENTAL DISORDER: ICD-10-CM

## 2023-06-29 DIAGNOSIS — F31.75 BIPOLAR DISORDER, IN PARTIAL REMISSION, MOST RECENT EPISODE DEPRESSED: Primary | ICD-10-CM

## 2023-06-29 DIAGNOSIS — F33.2 SEVERE EPISODE OF RECURRENT MAJOR DEPRESSIVE DISORDER, WITHOUT PSYCHOTIC FEATURES: ICD-10-CM

## 2023-06-29 PROCEDURE — 99214 OFFICE O/P EST MOD 30 MIN: CPT | Mod: S$GLB,,, | Performed by: PSYCHOLOGIST

## 2023-06-29 PROCEDURE — 99214 PR OFFICE/OUTPT VISIT, EST, LEVL IV, 30-39 MIN: ICD-10-PCS | Mod: S$GLB,,, | Performed by: PSYCHOLOGIST

## 2023-06-29 PROCEDURE — 3074F SYST BP LT 130 MM HG: CPT | Mod: CPTII,S$GLB,, | Performed by: PSYCHOLOGIST

## 2023-06-29 PROCEDURE — 3008F BODY MASS INDEX DOCD: CPT | Mod: CPTII,S$GLB,, | Performed by: PSYCHOLOGIST

## 2023-06-29 PROCEDURE — 3074F PR MOST RECENT SYSTOLIC BLOOD PRESSURE < 130 MM HG: ICD-10-PCS | Mod: CPTII,S$GLB,, | Performed by: PSYCHOLOGIST

## 2023-06-29 PROCEDURE — 99999 PR PBB SHADOW E&M-EST. PATIENT-LVL II: ICD-10-PCS | Mod: PBBFAC,,, | Performed by: PSYCHOLOGIST

## 2023-06-29 PROCEDURE — 1159F MED LIST DOCD IN RCRD: CPT | Mod: CPTII,S$GLB,, | Performed by: PSYCHOLOGIST

## 2023-06-29 PROCEDURE — 3078F PR MOST RECENT DIASTOLIC BLOOD PRESSURE < 80 MM HG: ICD-10-PCS | Mod: CPTII,S$GLB,, | Performed by: PSYCHOLOGIST

## 2023-06-29 PROCEDURE — 3008F PR BODY MASS INDEX (BMI) DOCUMENTED: ICD-10-PCS | Mod: CPTII,S$GLB,, | Performed by: PSYCHOLOGIST

## 2023-06-29 PROCEDURE — 99999 PR PBB SHADOW E&M-EST. PATIENT-LVL II: CPT | Mod: PBBFAC,,, | Performed by: PSYCHOLOGIST

## 2023-06-29 PROCEDURE — 3078F DIAST BP <80 MM HG: CPT | Mod: CPTII,S$GLB,, | Performed by: PSYCHOLOGIST

## 2023-06-29 PROCEDURE — 1159F PR MEDICATION LIST DOCUMENTED IN MEDICAL RECORD: ICD-10-PCS | Mod: CPTII,S$GLB,, | Performed by: PSYCHOLOGIST

## 2023-10-04 ENCOUNTER — LAB VISIT (OUTPATIENT)
Dept: LAB | Facility: HOSPITAL | Age: 23
End: 2023-10-04
Attending: FAMILY MEDICINE
Payer: COMMERCIAL

## 2023-10-04 ENCOUNTER — OFFICE VISIT (OUTPATIENT)
Dept: PRIMARY CARE CLINIC | Facility: CLINIC | Age: 23
End: 2023-10-04
Payer: COMMERCIAL

## 2023-10-04 VITALS
HEIGHT: 67 IN | DIASTOLIC BLOOD PRESSURE: 80 MMHG | TEMPERATURE: 98 F | HEART RATE: 91 BPM | BODY MASS INDEX: 33.19 KG/M2 | OXYGEN SATURATION: 98 % | WEIGHT: 211.44 LBS | SYSTOLIC BLOOD PRESSURE: 120 MMHG

## 2023-10-04 DIAGNOSIS — Z76.89 ENCOUNTER TO ESTABLISH CARE: Primary | ICD-10-CM

## 2023-10-04 DIAGNOSIS — Z79.899 LONG-TERM USE OF HIGH-RISK MEDICATION: ICD-10-CM

## 2023-10-04 DIAGNOSIS — E66.1 CLASS 1 DRUG-INDUCED OBESITY WITHOUT SERIOUS COMORBIDITY WITH BODY MASS INDEX (BMI) OF 33.0 TO 33.9 IN ADULT: ICD-10-CM

## 2023-10-04 DIAGNOSIS — R63.5 UNEXPLAINED WEIGHT GAIN: ICD-10-CM

## 2023-10-04 LAB
ALBUMIN SERPL BCP-MCNC: 4.7 G/DL (ref 3.5–5.2)
ALP SERPL-CCNC: 57 U/L (ref 55–135)
ALT SERPL W/O P-5'-P-CCNC: 27 U/L (ref 10–44)
ANION GAP SERPL CALC-SCNC: 13 MMOL/L (ref 8–16)
AST SERPL-CCNC: 26 U/L (ref 10–40)
BASOPHILS # BLD AUTO: 0.05 K/UL (ref 0–0.2)
BASOPHILS NFR BLD: 0.7 % (ref 0–1.9)
BILIRUB SERPL-MCNC: 0.4 MG/DL (ref 0.1–1)
BUN SERPL-MCNC: 11 MG/DL (ref 6–20)
CALCIUM SERPL-MCNC: 9.9 MG/DL (ref 8.7–10.5)
CHLORIDE SERPL-SCNC: 105 MMOL/L (ref 95–110)
CHOLEST SERPL-MCNC: 198 MG/DL (ref 120–199)
CHOLEST/HDLC SERPL: 5.2 {RATIO} (ref 2–5)
CO2 SERPL-SCNC: 24 MMOL/L (ref 23–29)
CREAT SERPL-MCNC: 1.2 MG/DL (ref 0.5–1.4)
DIFFERENTIAL METHOD: ABNORMAL
EOSINOPHIL # BLD AUTO: 0.4 K/UL (ref 0–0.5)
EOSINOPHIL NFR BLD: 5.2 % (ref 0–8)
ERYTHROCYTE [DISTWIDTH] IN BLOOD BY AUTOMATED COUNT: 13.2 % (ref 11.5–14.5)
EST. GFR  (NO RACE VARIABLE): >60 ML/MIN/1.73 M^2
ESTIMATED AVG GLUCOSE: 97 MG/DL (ref 68–131)
GLUCOSE SERPL-MCNC: 91 MG/DL (ref 70–110)
HBA1C MFR BLD: 5 % (ref 4–5.6)
HCT VFR BLD AUTO: 49.1 % (ref 40–54)
HDLC SERPL-MCNC: 38 MG/DL (ref 40–75)
HDLC SERPL: 19.2 % (ref 20–50)
HGB BLD-MCNC: 16.6 G/DL (ref 14–18)
IMM GRANULOCYTES # BLD AUTO: 0.06 K/UL (ref 0–0.04)
IMM GRANULOCYTES NFR BLD AUTO: 0.8 % (ref 0–0.5)
LDLC SERPL CALC-MCNC: 98.2 MG/DL (ref 63–159)
LYMPHOCYTES # BLD AUTO: 2.7 K/UL (ref 1–4.8)
LYMPHOCYTES NFR BLD: 37.3 % (ref 18–48)
MCH RBC QN AUTO: 31.7 PG (ref 27–31)
MCHC RBC AUTO-ENTMCNC: 33.8 G/DL (ref 32–36)
MCV RBC AUTO: 94 FL (ref 82–98)
MONOCYTES # BLD AUTO: 0.4 K/UL (ref 0.3–1)
MONOCYTES NFR BLD: 5.9 % (ref 4–15)
NEUTROPHILS # BLD AUTO: 3.6 K/UL (ref 1.8–7.7)
NEUTROPHILS NFR BLD: 50.1 % (ref 38–73)
NONHDLC SERPL-MCNC: 160 MG/DL
NRBC BLD-RTO: 0 /100 WBC
PLATELET # BLD AUTO: 270 K/UL (ref 150–450)
PMV BLD AUTO: 10.8 FL (ref 9.2–12.9)
POTASSIUM SERPL-SCNC: 4.4 MMOL/L (ref 3.5–5.1)
PROT SERPL-MCNC: 7.2 G/DL (ref 6–8.4)
RBC # BLD AUTO: 5.23 M/UL (ref 4.6–6.2)
SODIUM SERPL-SCNC: 142 MMOL/L (ref 136–145)
TRIGL SERPL-MCNC: 309 MG/DL (ref 30–150)
TSH SERPL DL<=0.005 MIU/L-ACNC: 1.26 UIU/ML (ref 0.4–4)
WBC # BLD AUTO: 7.24 K/UL (ref 3.9–12.7)

## 2023-10-04 PROCEDURE — 36415 COLL VENOUS BLD VENIPUNCTURE: CPT | Mod: PN | Performed by: FAMILY MEDICINE

## 2023-10-04 PROCEDURE — 80053 COMPREHEN METABOLIC PANEL: CPT | Performed by: FAMILY MEDICINE

## 2023-10-04 PROCEDURE — 99215 PR OFFICE/OUTPT VISIT, EST, LEVL V, 40-54 MIN: ICD-10-PCS | Mod: S$GLB,,, | Performed by: FAMILY MEDICINE

## 2023-10-04 PROCEDURE — 99215 OFFICE O/P EST HI 40 MIN: CPT | Mod: S$GLB,,, | Performed by: FAMILY MEDICINE

## 2023-10-04 PROCEDURE — 84443 ASSAY THYROID STIM HORMONE: CPT | Performed by: FAMILY MEDICINE

## 2023-10-04 PROCEDURE — 3079F PR MOST RECENT DIASTOLIC BLOOD PRESSURE 80-89 MM HG: ICD-10-PCS | Mod: CPTII,S$GLB,, | Performed by: FAMILY MEDICINE

## 2023-10-04 PROCEDURE — 1159F MED LIST DOCD IN RCRD: CPT | Mod: CPTII,S$GLB,, | Performed by: FAMILY MEDICINE

## 2023-10-04 PROCEDURE — 99999 PR PBB SHADOW E&M-EST. PATIENT-LVL IV: CPT | Mod: PBBFAC,,, | Performed by: FAMILY MEDICINE

## 2023-10-04 PROCEDURE — 3079F DIAST BP 80-89 MM HG: CPT | Mod: CPTII,S$GLB,, | Performed by: FAMILY MEDICINE

## 2023-10-04 PROCEDURE — 1159F PR MEDICATION LIST DOCUMENTED IN MEDICAL RECORD: ICD-10-PCS | Mod: CPTII,S$GLB,, | Performed by: FAMILY MEDICINE

## 2023-10-04 PROCEDURE — 3074F SYST BP LT 130 MM HG: CPT | Mod: CPTII,S$GLB,, | Performed by: FAMILY MEDICINE

## 2023-10-04 PROCEDURE — 80061 LIPID PANEL: CPT | Performed by: FAMILY MEDICINE

## 2023-10-04 PROCEDURE — 3008F BODY MASS INDEX DOCD: CPT | Mod: CPTII,S$GLB,, | Performed by: FAMILY MEDICINE

## 2023-10-04 PROCEDURE — 3008F PR BODY MASS INDEX (BMI) DOCUMENTED: ICD-10-PCS | Mod: CPTII,S$GLB,, | Performed by: FAMILY MEDICINE

## 2023-10-04 PROCEDURE — 85025 COMPLETE CBC W/AUTO DIFF WBC: CPT | Performed by: FAMILY MEDICINE

## 2023-10-04 PROCEDURE — 83036 HEMOGLOBIN GLYCOSYLATED A1C: CPT | Performed by: FAMILY MEDICINE

## 2023-10-04 PROCEDURE — 99999 PR PBB SHADOW E&M-EST. PATIENT-LVL IV: ICD-10-PCS | Mod: PBBFAC,,, | Performed by: FAMILY MEDICINE

## 2023-10-04 PROCEDURE — 3074F PR MOST RECENT SYSTOLIC BLOOD PRESSURE < 130 MM HG: ICD-10-PCS | Mod: CPTII,S$GLB,, | Performed by: FAMILY MEDICINE

## 2023-10-04 NOTE — PATIENT INSTRUCTIONS
Physically, everything looks pretty good today.      Let us get some screening blood work done today to make sure there is not a metabolic reason for your weight gain.  We will contact you with the results as soon as they are available.      Assuming everything comes back okay with the labs, remember, weight gain/weight loss is truly a function of calories in versus calories out.    Continue to eat a healthy diet.  Be careful with portion sizes.  Includes lots of fresh fruits, vegetables, whole grains, lean proteins.  See info below.    Keep hydrated.  Be sure to drink at least 8-10, 8 oz, glasses of water every day.    Stay active.  Try to do some sort of physical activity every day.  Nothing outrageous, just try walking for 10-15 minutes each day.     Again, if everything comes back with the labs, we can certainly use semaglutide to help with weight loss.  After I get the results, I will try sending a prescription for Wegovy (semaglutide) to the pharmacy.  If it is not covered by your insurance, or not in stock at the pharmacy, I will send a separate prescription for semaglutide to my pharmacy in [place].  At that point, I will send you their phone number and contact info so you can order the medication.    Either way, I do like to see you back about six weeks after you start on the medication so we can check your progress, adjust dosing, etc..

## 2023-10-04 NOTE — PROGRESS NOTES
"    [facility]       [address] [place] [address]      Phone: [phone]      Fax: [phone]    Reji Prescott MD                Office Visit  10/04/2023        Subjective      HPI:  Kiet Paulson is a 23 y.o. male presents today in clinic for "Weight Check  ."     23-year-old gentleman presents today to establish care, discuss weight loss.      Patient states that when he was younger he was taking multiple pills.  Using THC, MDMA, ketamine.  Stopped all of this about five years ago.  Did continue THC.  Stopped that about three years ago.  Since then, has been diagnosed with mental health issues, specifically bipolar disorder.  Sees a psychologist regularly.  They have him on Seroquel, Zoloft.  Doing quite well on these.    Unfortunately, these medications have caused him to call gained a lot of weight over time.  During the 1st year, he gained several lb fairly quickly.  Then, it was a gradual increase over time.    He tries to eat healthy as much as he can.  His aunt is a dietitian and calculated about 3085-2657 calories per day for him.  He tries to avoid red meat as much as possible.  Sticks with lean proteins.  Tries to avoid carbs.  Exercises frequently.  Walks daily.  Stays active.      Injured his right knee a few years ago.  Having the extra weight makes it hurt more.  Tends to dislocate from time to time, so he wears a brace on it.    PMH: Bipolar disorder   PSH:  Tonsils, adenoids.  Bilateral inguinal hernias as an infant.  Right hand.    F MH: CAD.  Cancer.  Obesity.  Dm.  Allergies:  Anesthetics.  Cephalosporins (child).  Latex.    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  T: Vapes  A:  Denies  D:  Denies    Exercise:  Walks daily, exercises.        The following were updated and reviewed by myself in the chart: medications, past medical history, past surgical history, family history, " social history, and allergies.     Medications:  Current Outpatient Medications on File Prior to Visit   Medication Sig Dispense Refill    QUEtiapine (SEROQUEL) 100 MG Tab Take 1 tablet (100 mg total) by mouth every evening. 90 tablet 2    sertraline (ZOLOFT) 50 MG tablet Take 1 tablet (50 mg total) by mouth once daily. 90 tablet 2     No current facility-administered medications on file prior to visit.        PMHx:  Past Medical History:   Diagnosis Date    Anxiety     Borderline personality disorder     Depression     RISHI (generalized anxiety disorder) 5/18/2021    Mild cannabis use disorder 5/18/2021      Patient Active Problem List    Diagnosis Date Noted    Insomnia due to other mental disorder 05/18/2021    Severe episode of recurrent major depressive disorder, without psychotic features 05/18/2021    Tobacco quit date established 05/18/2021    Borderline personality disorder 04/26/2021    Moderate episode of recurrent major depressive disorder 04/26/2021        PSHx:  Past Surgical History:   Procedure Laterality Date    ADENOIDECTOMY      HAND SURGERY      TONSILLECTOMY          FHx:  Family History   Problem Relation Age of Onset    Heart failure Father     Hypertension Father     Diabetes Maternal Grandmother     Heart failure Paternal Grandfather     Insulin resistance Mother         Social:  Social History     Socioeconomic History    Marital status: Significant Other    Number of children: 1   Occupational History    Occupation: industrial plant deliveries   Tobacco Use    Smoking status: Former     Types: Cigarettes, Vaping with nicotine    Smokeless tobacco: Never   Substance and Sexual Activity    Alcohol use: Not Currently    Drug use: Not Currently     Comment: past amphetamine and opioid abuse, reported quitting late 2016        Allergies:  Review of patient's allergies indicates:   Allergen Reactions    Benzocaine Rash    Adhesive Rash    Anesthetics - amide type - select amino amides      "Cephalosporins     Latex         ROS:  Review of Systems   Constitutional:  Negative for activity change, appetite change, chills and fever.   HENT:  Negative for congestion, postnasal drip, rhinorrhea, sore throat and trouble swallowing.    Respiratory:  Negative for cough and shortness of breath.    Cardiovascular:  Negative for chest pain and palpitations.   Gastrointestinal:  Negative for abdominal pain, constipation, diarrhea, nausea and vomiting.   Genitourinary:  Negative for difficulty urinating.   Musculoskeletal:  Negative for arthralgias and myalgias.   Skin:  Negative for color change and rash.   Neurological:  Negative for headaches.   All other systems reviewed and are negative.         Objective      /80   Pulse 91   Temp 98 °F (36.7 °C) (Tympanic)   Ht 5' 7" (1.702 m)   Wt 95.9 kg (211 lb 6.7 oz)   SpO2 98%   BMI 33.11 kg/m²   Ht Readings from Last 3 Encounters:   10/04/23 5' 7" (1.702 m)   04/26/21 5' 7" (1.702 m)     Wt Readings from Last 3 Encounters:   10/04/23 95.9 kg (211 lb 6.7 oz)   06/29/23 91.3 kg (201 lb 4.5 oz)   08/18/22 86 kg (189 lb 9.5 oz)       PHYSICAL EXAM:  Physical Exam  Vitals and nursing note reviewed.   Constitutional:       General: He is not in acute distress.     Appearance: Normal appearance.   HENT:      Head: Normocephalic and atraumatic.      Right Ear: Tympanic membrane, ear canal and external ear normal.      Left Ear: Tympanic membrane, ear canal and external ear normal.      Nose: Nose normal. No congestion or rhinorrhea.      Mouth/Throat:      Mouth: Mucous membranes are moist.      Pharynx: Oropharynx is clear. No oropharyngeal exudate or posterior oropharyngeal erythema.   Eyes:      Extraocular Movements: Extraocular movements intact.      Conjunctiva/sclera: Conjunctivae normal.      Pupils: Pupils are equal, round, and reactive to light.   Cardiovascular:      Rate and Rhythm: Normal rate and regular rhythm.   Pulmonary:      Effort: Pulmonary " effort is normal.      Breath sounds: No wheezing, rhonchi or rales.   Musculoskeletal:         General: Normal range of motion.      Cervical back: Normal range of motion.   Lymphadenopathy:      Cervical: No cervical adenopathy.   Skin:     General: Skin is warm and dry.   Neurological:      General: No focal deficit present.      Mental Status: He is alert.              LABS / IMAGING:  No results found for this or any previous visit (from the past 4368 hour(s)).      Assessment    1. Encounter to establish care    2. Unexplained weight gain    3. Class 1 drug-induced obesity without serious comorbidity with body mass index (BMI) of 33.0 to 33.9 in adult    4. Long-term use of high-risk medication          Plan    Kiet was seen today for weight check.    Diagnoses and all orders for this visit:    Encounter to establish care    Unexplained weight gain  -     CBC Auto Differential; Future  -     Comprehensive Metabolic Panel; Future  -     TSH; Future  -     Lipid Panel; Future  -     Hemoglobin A1C; Future    Class 1 drug-induced obesity without serious comorbidity with body mass index (BMI) of 33.0 to 33.9 in adult  -     CBC Auto Differential; Future  -     Comprehensive Metabolic Panel; Future  -     TSH; Future  -     Lipid Panel; Future  -     Hemoglobin A1C; Future    Long-term use of high-risk medication  -     CBC Auto Differential; Future  -     Comprehensive Metabolic Panel; Future  -     TSH; Future  -     Lipid Panel; Future  -     Hemoglobin A1C; Future      Physically, everything looks pretty good.      Will get screening labs, as above, to rule out metabolic disorders, such as thyroid, diabetes, etc..      If blood work all comes back okay, will try semaglutide to help with weight loss.    FOLLOW-UP:  Follow up in about 6 weeks (around 11/15/2023) for recheck.    I spent a total of 45 minutes face to face and non-face to face on the date of this visit.This includes time preparing to see the  patient (eg, review of tests, notes), obtaining and/or reviewing additional history from an independent historian and/or outside medical records, documenting clinical information in the electronic health record, independently interpreting results and/or communicating results to the patient/family/caregiver, or care coordinator.    Signed by:  Reji Prescott MD

## 2023-10-05 ENCOUNTER — PATIENT MESSAGE (OUTPATIENT)
Dept: PRIMARY CARE CLINIC | Facility: CLINIC | Age: 23
End: 2023-10-05
Payer: COMMERCIAL

## 2023-10-05 ENCOUNTER — TELEPHONE (OUTPATIENT)
Dept: PRIMARY CARE CLINIC | Facility: CLINIC | Age: 23
End: 2023-10-05
Payer: COMMERCIAL

## 2023-10-05 DIAGNOSIS — E66.1 CLASS 1 DRUG-INDUCED OBESITY WITHOUT SERIOUS COMORBIDITY WITH BODY MASS INDEX (BMI) OF 33.0 TO 33.9 IN ADULT: ICD-10-CM

## 2023-10-05 RX ORDER — SEMAGLUTIDE 0.25 MG/.5ML
0.25 INJECTION, SOLUTION SUBCUTANEOUS
Qty: 2 EACH | Refills: 11 | OUTPATIENT
Start: 2023-10-05

## 2023-10-05 RX ORDER — SEMAGLUTIDE 0.25 MG/.5ML
0.25 INJECTION, SOLUTION SUBCUTANEOUS
Qty: 2 ML | Refills: 11 | Status: SHIPPED | OUTPATIENT
Start: 2023-10-05 | End: 2023-10-05 | Stop reason: ALTCHOICE

## 2023-10-05 NOTE — PROGRESS NOTES
Overall, labs look pretty good.      Blood counts look fine.  Electrolytes, kidney function, liver function, and thyroid function were all normal.  Your random glucose and your A1c were both in the range, so no signs of diabetes.    Cholesterol, however was a bit off.  Total cholesterol, HDL, and LDL all look fine.  Triglycerides were quite elevated at 309.  I wonder if you had eaten sometime yesterday?  If so, that would certainly affect it.  Especially, because last year they were only 148.  I am not going to stress about it at this time.  We can plan to repeat this test in about one year.  When we recheck it, let us plan ahead and be sure we are fasting for it.  If triglycerides stay elevated start you on a medicine to target those specifically.      Speaking of medicine, I am going to send a prescription for Wegovy to your pharmacy to see if they will cover it.  If they do not, or if it is not in stock, them I will send a prescription to the other pharmacy we discussed.

## 2023-10-05 NOTE — TELEPHONE ENCOUNTER
Pharmacy is requesting that a PRIOR AUTHORIZATION/Not Covered be completed for the WEGOVY 0.25 MG/0.5 ML PEN. Please forward this request to the staff member handling PAs for your clinic. Thank you.      Note composed:1:55 PM 10/05/2023

## 2023-10-05 NOTE — PROGRESS NOTES
Random glucose, A1c, both in normal range.  No diabetes.  Will try sending prescription for Wegovy to his pharmacy.  If not covered, or not available, we can use [facility] instead.

## 2023-10-05 NOTE — TELEPHONE ENCOUNTER
No care due was identified.  [facility] Embedded Care Due Messages. Reference number: [id number].   10/05/2023 11:39:46 AM CDT

## 2023-10-06 ENCOUNTER — TELEPHONE (OUTPATIENT)
Dept: PRIMARY CARE CLINIC | Facility: CLINIC | Age: 23
End: 2023-10-06
Payer: COMMERCIAL

## 2023-10-06 NOTE — TELEPHONE ENCOUNTER
Spoke with pt and advised him to call [facility] pharmacy to check on RX. Pt verbalized understanding

## 2023-10-06 NOTE — TELEPHONE ENCOUNTER
----- Message from Debra Martínez sent at 10/6/2023 11:49 AM CDT -----  Regarding: pt call back  Type:Patient Returning Call:Pt        Who Called: n/a         Who Left Message for Patient: n/a         Does the patient know what this is regarding? Missed call         Best Call Back Number: Telephone Information:  [employer]          [phone]            Additional Information:

## 2023-11-15 ENCOUNTER — OFFICE VISIT (OUTPATIENT)
Dept: PRIMARY CARE CLINIC | Facility: CLINIC | Age: 23
End: 2023-11-15
Payer: COMMERCIAL

## 2023-11-15 VITALS
BODY MASS INDEX: 31.35 KG/M2 | HEART RATE: 93 BPM | TEMPERATURE: 98 F | SYSTOLIC BLOOD PRESSURE: 110 MMHG | HEIGHT: 67 IN | WEIGHT: 199.75 LBS | DIASTOLIC BLOOD PRESSURE: 76 MMHG

## 2023-11-15 DIAGNOSIS — E66.1 CLASS 1 DRUG-INDUCED OBESITY WITHOUT SERIOUS COMORBIDITY WITH BODY MASS INDEX (BMI) OF 33.0 TO 33.9 IN ADULT: Primary | ICD-10-CM

## 2023-11-15 PROCEDURE — 1159F MED LIST DOCD IN RCRD: CPT | Mod: CPTII,S$GLB,, | Performed by: FAMILY MEDICINE

## 2023-11-15 PROCEDURE — 3008F PR BODY MASS INDEX (BMI) DOCUMENTED: ICD-10-PCS | Mod: CPTII,S$GLB,, | Performed by: FAMILY MEDICINE

## 2023-11-15 PROCEDURE — 3008F BODY MASS INDEX DOCD: CPT | Mod: CPTII,S$GLB,, | Performed by: FAMILY MEDICINE

## 2023-11-15 PROCEDURE — 99999 PR PBB SHADOW E&M-EST. PATIENT-LVL IV: CPT | Mod: PBBFAC,,, | Performed by: FAMILY MEDICINE

## 2023-11-15 PROCEDURE — 3074F SYST BP LT 130 MM HG: CPT | Mod: CPTII,S$GLB,, | Performed by: FAMILY MEDICINE

## 2023-11-15 PROCEDURE — 3078F PR MOST RECENT DIASTOLIC BLOOD PRESSURE < 80 MM HG: ICD-10-PCS | Mod: CPTII,S$GLB,, | Performed by: FAMILY MEDICINE

## 2023-11-15 PROCEDURE — 3078F DIAST BP <80 MM HG: CPT | Mod: CPTII,S$GLB,, | Performed by: FAMILY MEDICINE

## 2023-11-15 PROCEDURE — 99215 PR OFFICE/OUTPT VISIT, EST, LEVL V, 40-54 MIN: ICD-10-PCS | Mod: S$GLB,,, | Performed by: FAMILY MEDICINE

## 2023-11-15 PROCEDURE — 3074F PR MOST RECENT SYSTOLIC BLOOD PRESSURE < 130 MM HG: ICD-10-PCS | Mod: CPTII,S$GLB,, | Performed by: FAMILY MEDICINE

## 2023-11-15 PROCEDURE — 99999 PR PBB SHADOW E&M-EST. PATIENT-LVL IV: ICD-10-PCS | Mod: PBBFAC,,, | Performed by: FAMILY MEDICINE

## 2023-11-15 PROCEDURE — 99215 OFFICE O/P EST HI 40 MIN: CPT | Mod: S$GLB,,, | Performed by: FAMILY MEDICINE

## 2023-11-15 PROCEDURE — 1159F PR MEDICATION LIST DOCUMENTED IN MEDICAL RECORD: ICD-10-PCS | Mod: CPTII,S$GLB,, | Performed by: FAMILY MEDICINE

## 2023-11-15 PROCEDURE — 3044F HG A1C LEVEL LT 7.0%: CPT | Mod: CPTII,S$GLB,, | Performed by: FAMILY MEDICINE

## 2023-11-15 PROCEDURE — 3044F PR MOST RECENT HEMOGLOBIN A1C LEVEL <7.0%: ICD-10-PCS | Mod: CPTII,S$GLB,, | Performed by: FAMILY MEDICINE

## 2023-11-15 NOTE — PATIENT INSTRUCTIONS
Physically, everything looks great today!     Congratulations on the weight loss!  According to our scale, you are down about 12 lb.      Everything you are doing at home, especially decreasing portion sizes, drinking lots of water, etc., is certainly working.    Give the semaglutide another two weeks at 10 units (0.5 mg).  Often, the weight loss will restart on its own.  If not, go ahead and increase to 20 units (1 mg) weekly.    Continue to eat a healthy diet.  Be careful with portion sizes.  Includes lots of fresh fruits, vegetables, whole grains, lean proteins.  See info below.    Keep hydrated.  Be sure to drink at least 8-10, 8 oz, glasses of water every day.    Stay active.  Try to do some sort of physical activity every day.  Nothing outrageous, just try walking for 10-15 minutes each day.

## 2023-11-15 NOTE — PROGRESS NOTES
"    [facility]       [address] [place] [address]      Phone: [phone]      Fax: [phone]    Reji Prescott MD                Office Visit  11/15/2023        Subjective      HPI:  Kiet Paulson is a 23 y.o. male presents today in clinic for "Follow-up  ."     23-year-old gentleman presents today to follow-up on weight loss.      Last visit, we started him on semaglutide from [facility] to help with weight loss.  Has been taking 10 units weekly.  Initially, 1st couple of days, he felt nauseous, sick.  That settled down and resolved.  Now, tolerating the medication just fine.  Has been able to lose about 12 lb since last visit.  On his scale at home, he is 195 lb.  He is 199 lb on our scale.    In addition to medication, he is radically changed his diet.  No more fast food, junk food, sodas.  Much smaller portions.  Drinking more water.  Doing more exercise.  Cut his calories down to about 500-800 per day.  Eats a small breakfast early in the morning.  Has a small, healthy, lunch around 10 30 or 11.  Then, has a snack around two or 3:00 p.m..  Small dinner around four or 5:00 p.m..    Feeling much better overall.    PMH: Bipolar disorder   PSH:  Tonsils, adenoids.  Bilateral inguinal hernias as an infant.  Right hand.    F MH: CAD.  Cancer.  Obesity.  Dm.  Allergies:  Anesthetics.  Cephalosporins (child).  Latex.    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  T: Cyrus  A:  Slim  D:  Denies    Exercise:  Walks daily, exercises.        The following were updated and reviewed by myself in the chart: medications, past medical history, past surgical history, family history, social history, and allergies.     Medications:  Current Outpatient Medications on File Prior to Visit   Medication Sig Dispense Refill    INV semaglutide/placebo injection Semaglutide 5mg / Cyanocobalamin 0.5mg / 1 mL - Inject 10 units " "(0.1 mL) SQ every 7 days - Disp: 2.5 mL vial - Please disp with U-50 or U-100 syringes and 5/32" 32g needles. 2.5 mL 3    QUEtiapine (SEROQUEL) 100 MG Tab Take 1 tablet (100 mg total) by mouth every evening. 90 tablet 2    sertraline (ZOLOFT) 50 MG tablet Take 1 tablet (50 mg total) by mouth once daily. 90 tablet 2     No current facility-administered medications on file prior to visit.        PMHx:  Past Medical History:   Diagnosis Date    Anxiety     Borderline personality disorder     Depression     RISHI (generalized anxiety disorder) 5/18/2021    Mild cannabis use disorder 5/18/2021      Patient Active Problem List    Diagnosis Date Noted    Insomnia due to other mental disorder 05/18/2021    Severe episode of recurrent major depressive disorder, without psychotic features 05/18/2021    Tobacco quit date established 05/18/2021    Borderline personality disorder 04/26/2021    Moderate episode of recurrent major depressive disorder 04/26/2021        PSHx:  Past Surgical History:   Procedure Laterality Date    ADENOIDECTOMY      HAND SURGERY      TONSILLECTOMY          FHx:  Family History   Problem Relation Age of Onset    Heart failure Father     Hypertension Father     Diabetes Maternal Grandmother     Heart failure Paternal Grandfather     Insulin resistance Mother         Social:  Social History     Socioeconomic History    Marital status: Significant Other    Number of children: 1   Occupational History    Occupation: industrial plant deliveries   Tobacco Use    Smoking status: Former     Types: Cigarettes, Vaping with nicotine    Smokeless tobacco: Never   Substance and Sexual Activity    Alcohol use: Not Currently    Drug use: Not Currently     Comment: past amphetamine and opioid abuse, reported quitting late 2016        Allergies:  Review of patient's allergies indicates:   Allergen Reactions    Anesthetics - amide type - select amino amides Anaphylaxis     Blood pressure and HR drops severely    " "Benzocaine Rash    Adhesive Rash    Latex     Cephalosporins Other (See Comments) and Rash        ROS:  Review of Systems   Constitutional:  Negative for activity change, appetite change, chills and fever.   HENT:  Negative for congestion, postnasal drip, rhinorrhea, sore throat and trouble swallowing.    Respiratory:  Negative for cough and shortness of breath.    Cardiovascular:  Negative for chest pain and palpitations.   Gastrointestinal:  Negative for abdominal pain, constipation, diarrhea, nausea and vomiting.   Genitourinary:  Negative for difficulty urinating.   Musculoskeletal:  Negative for arthralgias and myalgias.   Skin:  Negative for color change and rash.   Neurological:  Negative for headaches.   All other systems reviewed and are negative.         Objective      /76   Pulse 93   Temp 97.7 °F (36.5 °C)   Ht 5' 7" (1.702 m)   Wt 90.6 kg (199 lb 11.8 oz)   BMI 31.28 kg/m²   Ht Readings from Last 3 Encounters:   11/15/23 5' 7" (1.702 m)   10/04/23 5' 7" (1.702 m)   04/26/21 5' 7" (1.702 m)     Wt Readings from Last 3 Encounters:   11/15/23 90.6 kg (199 lb 11.8 oz)   10/04/23 95.9 kg (211 lb 6.7 oz)   06/29/23 91.3 kg (201 lb 4.5 oz)       PHYSICAL EXAM:  Physical Exam  Vitals and nursing note reviewed.   Constitutional:       General: He is not in acute distress.     Appearance: Normal appearance.   HENT:      Head: Normocephalic and atraumatic.      Right Ear: Tympanic membrane, ear canal and external ear normal.      Left Ear: Tympanic membrane, ear canal and external ear normal.      Nose: Nose normal. No congestion or rhinorrhea.      Mouth/Throat:      Mouth: Mucous membranes are moist.      Pharynx: Oropharynx is clear. No oropharyngeal exudate or posterior oropharyngeal erythema.   Eyes:      Extraocular Movements: Extraocular movements intact.      Conjunctiva/sclera: Conjunctivae normal.      Pupils: Pupils are equal, round, and reactive to light.   Cardiovascular:      Rate and " Rhythm: Normal rate and regular rhythm.   Pulmonary:      Effort: Pulmonary effort is normal.      Breath sounds: No wheezing, rhonchi or rales.   Musculoskeletal:         General: Normal range of motion.      Cervical back: Normal range of motion.   Lymphadenopathy:      Cervical: No cervical adenopathy.   Skin:     General: Skin is warm and dry.   Neurological:      General: No focal deficit present.      Mental Status: He is alert.              LABS / IMAGING:  Recent Results (from the past 4368 hour(s))   CBC Auto Differential    Collection Time: 10/04/23  1:43 PM   Result Value Ref Range    WBC 7.24 3.90 - 12.70 K/uL    RBC 5.23 4.60 - 6.20 M/uL    Hemoglobin 16.6 14.0 - 18.0 g/dL    Hematocrit 49.1 40.0 - 54.0 %    MCV 94 82 - 98 fL    MCH 31.7 (H) 27.0 - 31.0 pg    MCHC 33.8 32.0 - 36.0 g/dL    RDW 13.2 11.5 - 14.5 %    Platelets 270 150 - 450 K/uL    MPV 10.8 9.2 - 12.9 fL    Immature Granulocytes 0.8 (H) 0.0 - 0.5 %    Gran # (ANC) 3.6 1.8 - 7.7 K/uL    Immature Grans (Abs) 0.06 (H) 0.00 - 0.04 K/uL    Lymph # 2.7 1.0 - 4.8 K/uL    Mono # 0.4 0.3 - 1.0 K/uL    Eos # 0.4 0.0 - 0.5 K/uL    Baso # 0.05 0.00 - 0.20 K/uL    nRBC 0 0 /100 WBC    Gran % 50.1 38.0 - 73.0 %    Lymph % 37.3 18.0 - 48.0 %    Mono % 5.9 4.0 - 15.0 %    Eosinophil % 5.2 0.0 - 8.0 %    Basophil % 0.7 0.0 - 1.9 %    Differential Method Automated    Comprehensive Metabolic Panel    Collection Time: 10/04/23  1:43 PM   Result Value Ref Range    Sodium 142 136 - 145 mmol/L    Potassium 4.4 3.5 - 5.1 mmol/L    Chloride 105 95 - 110 mmol/L    CO2 24 23 - 29 mmol/L    Glucose 91 70 - 110 mg/dL    BUN 11 6 - 20 mg/dL    Creatinine 1.2 0.5 - 1.4 mg/dL    Calcium 9.9 8.7 - 10.5 mg/dL    Total Protein 7.2 6.0 - 8.4 g/dL    Albumin 4.7 3.5 - 5.2 g/dL    Total Bilirubin 0.4 0.1 - 1.0 mg/dL    Alkaline Phosphatase 57 55 - 135 U/L    AST 26 10 - 40 U/L    ALT 27 10 - 44 U/L    eGFR >60.0 >60 mL/min/1.73 m^2    Anion Gap 13 8 - 16 mmol/L   TSH     Collection Time: 10/04/23  1:43 PM   Result Value Ref Range    TSH 1.256 0.400 - 4.000 uIU/mL   Lipid Panel    Collection Time: 10/04/23  1:43 PM   Result Value Ref Range    Cholesterol 198 120 - 199 mg/dL    Triglycerides 309 (H) 30 - 150 mg/dL    HDL 38 (L) 40 - 75 mg/dL    LDL Cholesterol 98.2 63.0 - 159.0 mg/dL    HDL/Cholesterol Ratio 19.2 (L) 20.0 - 50.0 %    Total Cholesterol/HDL Ratio 5.2 (H) 2.0 - 5.0    Non-HDL Cholesterol 160 mg/dL   Hemoglobin A1C    Collection Time: 10/04/23  1:43 PM   Result Value Ref Range    Hemoglobin A1C 5.0 4.0 - 5.6 %    Estimated Avg Glucose 97 68 - 131 mg/dL         Assessment    1. Class 1 drug-induced obesity without serious comorbidity with body mass index (BMI) of 33.0 to 33.9 in adult          Plan    Kiet was seen today for follow-up.    Diagnoses and all orders for this visit:    Class 1 drug-induced obesity without serious comorbidity with body mass index (BMI) of 33.0 to 33.9 in adult    Doing well with semaglutide.  On our scale, he is down 12 lb already.  Continue to focus on diet, exercise.      Would like him to stay at the 10 units (0.5 mg) dose for at least two more weeks, then okay to increase to 20 units (1 mg) weekly.    FOLLOW-UP:  Follow up in about 3 months (around 2/15/2024) for recheck.    I spent a total of 45 minutes face to face and non-face to face on the date of this visit.This includes time preparing to see the patient (eg, review of tests, notes), obtaining and/or reviewing additional history from an independent historian and/or outside medical records, documenting clinical information in the electronic health record, independently interpreting results and/or communicating results to the patient/family/caregiver, or care coordinator.    Signed by:  Reji Prescott MD

## 2024-01-04 ENCOUNTER — TELEPHONE (OUTPATIENT)
Dept: PSYCHIATRY | Facility: CLINIC | Age: 24
End: 2024-01-04
Payer: COMMERCIAL

## 2024-01-10 ENCOUNTER — TELEPHONE (OUTPATIENT)
Dept: PSYCHIATRY | Facility: CLINIC | Age: 24
End: 2024-01-10
Payer: COMMERCIAL

## 2024-01-11 NOTE — PROGRESS NOTES
"MEDICATION MANAGEMENT SESSION: VIRTUAL    Name: Kiet Paulson  Age: 23 y.o.  : 2000    Preferred Name: Kiet    Referring provider: Dr. Thelma Camarillo    Reason for Visit:  Medication follow up    Summary of Visit:  Pt reports he continues to do well with Zoloft 50 mg qd and Seroquel 100 mg qhs. His mood has been stable for the past 6 months. He has been sleeping well, his energy level is good, and he has not experienced hypomanic/manic symptoms since last visit. He reports anxiety has not been a problem. He completed his associates degree and is now working on his bachelor's in IT/computer science. He likes his new job and the hours.    Current Symptoms:   ADHD: denied   Depressive Disorder: tired/fatigued   Anxiety Disorder: anxiety/nervousness, fatigue   Panic Disorder: denied   Manic Disorder: denied   Psychotic Disorder: denied   Substance Use:  denied     Review of Systems   Constitutional:  Positive for fatigue. Negative for activity change, appetite change and unexpected weight change.   Respiratory:  Negative for shortness of breath.    Psychiatric/Behavioral:  Negative for agitation, behavioral problems, confusion, decreased concentration, dysphoric mood, hallucinations, self-injury, sleep disturbance and suicidal ideas. The patient is nervous/anxious. The patient is not hyperactive.       Constitutional:  Vitals:  Most recent vital signs were reviewed.   Last 3 sets of Vitals        2023     2:57 PM 10/4/2023    12:02 PM 11/15/2023    12:16 PM   Vitals - 1 value per visit   SYSTOLIC 125 120 110   DIASTOLIC 74 80 76   Pulse 74 91 93   Temp  98 °F (36.7 °C) 97.7 °F (36.5 °C)   SPO2  98 %    Weight (lb) 201.28 211.42 199.74   Weight (kg) 91.3 95.9 90.6   Height  5' 7" (1.702 m) 5' 7" (1.702 m)   BMI (Calculated)  33.1 31.3   Pain Score  Zero Zero          Psychiatric:  Oriented: x 3   Attitude: cooperative   Eye Contact: good   Behavior: wnl   Mood: "okay"  Affect: appropriate range   Attention: " "intact   Concentration: grossly intact   Thought Process: goal directed   Speech: intelligible  Volume: WNL   Quantity: WNL   Rhythm: WNL  Insight: fair to good   Threats: no SI / HI   Memory: Grossly intact  Psychosis: denies all   Estimate of Intellectual Function: average   Judgment: fair to good   Relevant Elements of Neurological Exam: normal gait     Allergy Review:   Review of patient's allergies indicates:   Allergen Reactions    Anesthetics - amide type - select amino amides Anaphylaxis     Blood pressure and HR drops severely    Benzocaine Rash    Adhesive Rash    Latex     Cephalosporins Other (See Comments) and Rash      Medical Problem List:   Patient Active Problem List   Diagnosis    Borderline personality disorder    Moderate episode of recurrent major depressive disorder    Insomnia due to other mental disorder    Severe episode of recurrent major depressive disorder, without psychotic features    Tobacco quit date established      Encounter Diagnoses   Name Primary?    Bipolar disorder, in partial remission, most recent episode depressed Yes    Insomnia due to other mental disorder     Severe episode of recurrent major depressive disorder, without psychotic features     RISHI (generalized anxiety disorder)       PLAN:  Medication Management: Continue current medications.    Follow up in about 6 months (around 7/12/2024) for Medication follow up.     Medication List with Changes/Refills   Current Medications    INV SEMAGLUTIDE/PLACEBO INJECTION    Semaglutide 5mg / Cyanocobalamin 0.5mg / 1 mL - Inject 10 units (0.1 mL) SQ every 7 days - Disp: 2.5 mL vial - Please disp with U-50 or U-100 syringes and 5/32" 32g needles.   Changed and/or Refilled Medications    Modified Medication Previous Medication    QUETIAPINE (SEROQUEL) 100 MG TAB QUEtiapine (SEROQUEL) 100 MG Tab       Take 1 tablet (100 mg total) by mouth every evening.    Take 1 tablet (100 mg total) by mouth every evening.    SERTRALINE (ZOLOFT) " 50 MG TABLET sertraline (ZOLOFT) 50 MG tablet       Take 1 tablet (50 mg total) by mouth once daily.    Take 1 tablet (50 mg total) by mouth once daily.      Time spent with pt including note preparation: 30 minutes     Holley Snell, PhD, MP  Medical Psychologist

## 2024-01-12 ENCOUNTER — OFFICE VISIT (OUTPATIENT)
Dept: PSYCHIATRY | Facility: CLINIC | Age: 24
End: 2024-01-12
Payer: COMMERCIAL

## 2024-01-12 DIAGNOSIS — F51.05 INSOMNIA DUE TO OTHER MENTAL DISORDER: ICD-10-CM

## 2024-01-12 DIAGNOSIS — F41.1 GAD (GENERALIZED ANXIETY DISORDER): ICD-10-CM

## 2024-01-12 DIAGNOSIS — F31.75 BIPOLAR DISORDER, IN PARTIAL REMISSION, MOST RECENT EPISODE DEPRESSED: Primary | ICD-10-CM

## 2024-01-12 DIAGNOSIS — F99 INSOMNIA DUE TO OTHER MENTAL DISORDER: ICD-10-CM

## 2024-01-12 DIAGNOSIS — F33.2 SEVERE EPISODE OF RECURRENT MAJOR DEPRESSIVE DISORDER, WITHOUT PSYCHOTIC FEATURES: ICD-10-CM

## 2024-01-12 PROCEDURE — 99214 OFFICE O/P EST MOD 30 MIN: CPT | Mod: 95,,, | Performed by: PSYCHOLOGIST

## 2024-01-12 PROCEDURE — 1159F MED LIST DOCD IN RCRD: CPT | Mod: CPTII,95,, | Performed by: PSYCHOLOGIST

## 2024-01-12 RX ORDER — QUETIAPINE FUMARATE 100 MG/1
100 TABLET, FILM COATED ORAL NIGHTLY
Qty: 90 TABLET | Refills: 2 | Status: SHIPPED | OUTPATIENT
Start: 2024-01-12 | End: 2024-10-08

## 2024-01-12 RX ORDER — SERTRALINE HYDROCHLORIDE 50 MG/1
50 TABLET, FILM COATED ORAL DAILY
Qty: 90 TABLET | Refills: 2 | Status: SHIPPED | OUTPATIENT
Start: 2024-01-12 | End: 2024-10-08

## 2024-02-19 ENCOUNTER — OFFICE VISIT (OUTPATIENT)
Dept: PRIMARY CARE CLINIC | Facility: CLINIC | Age: 24
End: 2024-02-19
Payer: COMMERCIAL

## 2024-02-19 VITALS
TEMPERATURE: 96 F | DIASTOLIC BLOOD PRESSURE: 64 MMHG | BODY MASS INDEX: 28.55 KG/M2 | OXYGEN SATURATION: 99 % | HEIGHT: 67 IN | WEIGHT: 181.88 LBS | SYSTOLIC BLOOD PRESSURE: 110 MMHG | HEART RATE: 82 BPM

## 2024-02-19 DIAGNOSIS — Z71.3 WEIGHT LOSS COUNSELING, ENCOUNTER FOR: Primary | ICD-10-CM

## 2024-02-19 DIAGNOSIS — E66.1 CLASS 1 DRUG-INDUCED OBESITY WITHOUT SERIOUS COMORBIDITY WITH BODY MASS INDEX (BMI) OF 33.0 TO 33.9 IN ADULT: ICD-10-CM

## 2024-02-19 PROCEDURE — 3074F SYST BP LT 130 MM HG: CPT | Mod: CPTII,S$GLB,, | Performed by: FAMILY MEDICINE

## 2024-02-19 PROCEDURE — 3078F DIAST BP <80 MM HG: CPT | Mod: CPTII,S$GLB,, | Performed by: FAMILY MEDICINE

## 2024-02-19 PROCEDURE — 99999 PR PBB SHADOW E&M-EST. PATIENT-LVL IV: CPT | Mod: PBBFAC,,, | Performed by: FAMILY MEDICINE

## 2024-02-19 PROCEDURE — 99215 OFFICE O/P EST HI 40 MIN: CPT | Mod: S$GLB,,, | Performed by: FAMILY MEDICINE

## 2024-02-19 PROCEDURE — 1159F MED LIST DOCD IN RCRD: CPT | Mod: CPTII,S$GLB,, | Performed by: FAMILY MEDICINE

## 2024-02-19 PROCEDURE — G2211 COMPLEX E/M VISIT ADD ON: HCPCS | Mod: S$GLB,,, | Performed by: FAMILY MEDICINE

## 2024-02-19 PROCEDURE — 3008F BODY MASS INDEX DOCD: CPT | Mod: CPTII,S$GLB,, | Performed by: FAMILY MEDICINE

## 2024-02-19 NOTE — PATIENT INSTRUCTIONS
Congratulations on the weight loss! You are doing very well!     Continue using 15 units of the semaglutide weekly.  If you find that cravings returns sooner, or that you plateau in your weight loss, you have by okay to increase to 20 units weekly.  Otherwise, try to stretch it out as long as you can.      Continue to eat a healthy diet.  Be careful with portion sizes.  Includes lots of fresh fruits, vegetables, whole grains, lean proteins.  See info below.    Keep hydrated.  Be sure to drink at least 8-10, 8 oz, glasses of water every day.    Stay active.  Try to do some sort of physical activity every day.  Nothing outrageous, just try walking for 10-15 minutes each day.

## 2024-02-20 NOTE — PROGRESS NOTES
"    [facility]       [address] [place] [address]      Phone: [phone]      Fax: [phone]    Reji Prescott MD                Office Visit  02/19/2024        Subjective      HPI:  Kiet Paulson is a 23 y.o. male presents today in clinic for "Follow-up  ."     23-year-old gentleman presents today to follow-up on weight loss.      At previous visit, we started him on semaglutide from [facility] to help with weight loss.  Was taking 10 units weekly.  When he plateaued, we gave the go ahead to increase to 20 units (1 mg) weekly.  Instead, he will leave increased to 15 units (0.75 mg) weekly.  Doing quite well on this.  Has lost another 19-20 lb.  His goal is to get to 160 lb.  Tolerates the medicine well.  No nausea, vomiting.  No heartburn, reflux.    In addition to medication, he has radically changed his diet.  Still avoiding fast food, junk food, sodas.  Much smaller portions.  Drinking more water.  Doing more exercise.  Cut his calories down to about 500-800 per day.  Eats a small breakfast early in the morning.  Has a small, healthy, lunch around 10 30 or 11.  Then, has a snack around two or 3:00 p.m..  Small dinner around four or 5:00 p.m..      Feeling much better overall.  Energy levels have increased.  Sleeping better, no more snoring.  Thinking better.  Much more active, frequently goes outside to play with his kids.    PMH: Bipolar disorder   PSH:  Tonsils, adenoids.  Bilateral inguinal hernias as an infant.  Right hand.    F MH: CAD.  Cancer.  Obesity.  Dm.  Allergies:  Anesthetics.  Cephalosporins (child).  Latex.    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  Computer repair shop.  Photography.  T: Cyrus  A:  Slim  D:  Denies    Exercise:  Walks daily, exercises.        The following were updated and reviewed by myself in the chart: medications, past medical history, past surgical history, family history, social history, and " "allergies.     Medications:  Current Outpatient Medications on File Prior to Visit   Medication Sig Dispense Refill    QUEtiapine (SEROQUEL) 100 MG Tab Take 1 tablet (100 mg total) by mouth every evening. 90 tablet 2    sertraline (ZOLOFT) 50 MG tablet Take 1 tablet (50 mg total) by mouth once daily. 90 tablet 2    [DISCONTINUED] INV semaglutide/placebo injection Semaglutide 5mg / Cyanocobalamin 0.5mg / 1 mL - Inject 10 units (0.1 mL) SQ every 7 days - Disp: 2.5 mL vial - Please disp with U-50 or U-100 syringes and 5/32" 32g needles. 2.5 mL 3     No current facility-administered medications on file prior to visit.        PMHx:  Past Medical History:   Diagnosis Date    Anxiety     Borderline personality disorder     Depression     RISHI (generalized anxiety disorder) 5/18/2021    Mild cannabis use disorder 5/18/2021      Patient Active Problem List    Diagnosis Date Noted    Insomnia due to other mental disorder 05/18/2021    Severe episode of recurrent major depressive disorder, without psychotic features 05/18/2021    Tobacco quit date established 05/18/2021    Borderline personality disorder 04/26/2021    Moderate episode of recurrent major depressive disorder 04/26/2021        PSHx:  Past Surgical History:   Procedure Laterality Date    ADENOIDECTOMY      HAND SURGERY      TONSILLECTOMY          FHx:  Family History   Problem Relation Age of Onset    Heart failure Father     Hypertension Father     Diabetes Maternal Grandmother     Heart failure Paternal Grandfather     Insulin resistance Mother         Social:  Social History     Socioeconomic History    Marital status: Significant Other    Number of children: 1   Occupational History    Occupation: industrial plant deliveries   Tobacco Use    Smoking status: Former     Types: Cigarettes, Vaping with nicotine    Smokeless tobacco: Never   Substance and Sexual Activity    Alcohol use: Not Currently    Drug use: Not Currently     Comment: past amphetamine and " "opioid abuse, reported quitting late 2016        Allergies:  Review of patient's allergies indicates:   Allergen Reactions    Anesthetics - amide type - select amino amides Anaphylaxis     Blood pressure and HR drops severely    Benzocaine Rash    Adhesive Rash    Latex     Cephalosporins Other (See Comments) and Rash        ROS:  Review of Systems   Constitutional:  Negative for activity change, appetite change, chills and fever.   HENT:  Negative for congestion, postnasal drip, rhinorrhea, sore throat and trouble swallowing.    Respiratory:  Negative for cough and shortness of breath.    Cardiovascular:  Negative for chest pain and palpitations.   Gastrointestinal:  Negative for abdominal pain, constipation, diarrhea, nausea and vomiting.   Genitourinary:  Negative for difficulty urinating.   Musculoskeletal:  Negative for arthralgias and myalgias.   Skin:  Negative for color change and rash.   Neurological:  Negative for headaches.   All other systems reviewed and are negative.         Objective      /64   Pulse 82   Temp 96.3 °F (35.7 °C)   Ht 5' 7" (1.702 m)   Wt 82.5 kg (181 lb 14.1 oz)   SpO2 99%   BMI 28.49 kg/m²   Ht Readings from Last 3 Encounters:   02/19/24 5' 7" (1.702 m)   11/15/23 5' 7" (1.702 m)   10/04/23 5' 7" (1.702 m)     Wt Readings from Last 3 Encounters:   02/19/24 82.5 kg (181 lb 14.1 oz)   11/15/23 90.6 kg (199 lb 11.8 oz)   10/04/23 95.9 kg (211 lb 6.7 oz)       PHYSICAL EXAM:  Physical Exam  Vitals and nursing note reviewed.   Constitutional:       General: He is not in acute distress.     Appearance: Normal appearance.   HENT:      Head: Normocephalic and atraumatic.      Right Ear: Tympanic membrane, ear canal and external ear normal.      Left Ear: Tympanic membrane, ear canal and external ear normal.      Nose: Nose normal. No congestion or rhinorrhea.      Mouth/Throat:      Mouth: Mucous membranes are moist.      Pharynx: Oropharynx is clear. No oropharyngeal exudate or " posterior oropharyngeal erythema.   Eyes:      Extraocular Movements: Extraocular movements intact.      Conjunctiva/sclera: Conjunctivae normal.      Pupils: Pupils are equal, round, and reactive to light.   Cardiovascular:      Rate and Rhythm: Normal rate and regular rhythm.   Pulmonary:      Effort: Pulmonary effort is normal.      Breath sounds: No wheezing, rhonchi or rales.   Musculoskeletal:         General: Normal range of motion.      Cervical back: Normal range of motion.   Lymphadenopathy:      Cervical: No cervical adenopathy.   Skin:     General: Skin is warm and dry.   Neurological:      General: No focal deficit present.      Mental Status: He is alert.              LABS / IMAGING:  Recent Results (from the past 4368 hour(s))   CBC Auto Differential    Collection Time: 10/04/23  1:43 PM   Result Value Ref Range    WBC 7.24 3.90 - 12.70 K/uL    RBC 5.23 4.60 - 6.20 M/uL    Hemoglobin 16.6 14.0 - 18.0 g/dL    Hematocrit 49.1 40.0 - 54.0 %    MCV 94 82 - 98 fL    MCH 31.7 (H) 27.0 - 31.0 pg    MCHC 33.8 32.0 - 36.0 g/dL    RDW 13.2 11.5 - 14.5 %    Platelets 270 150 - 450 K/uL    MPV 10.8 9.2 - 12.9 fL    Immature Granulocytes 0.8 (H) 0.0 - 0.5 %    Gran # (ANC) 3.6 1.8 - 7.7 K/uL    Immature Grans (Abs) 0.06 (H) 0.00 - 0.04 K/uL    Lymph # 2.7 1.0 - 4.8 K/uL    Mono # 0.4 0.3 - 1.0 K/uL    Eos # 0.4 0.0 - 0.5 K/uL    Baso # 0.05 0.00 - 0.20 K/uL    nRBC 0 0 /100 WBC    Gran % 50.1 38.0 - 73.0 %    Lymph % 37.3 18.0 - 48.0 %    Mono % 5.9 4.0 - 15.0 %    Eosinophil % 5.2 0.0 - 8.0 %    Basophil % 0.7 0.0 - 1.9 %    Differential Method Automated    Comprehensive Metabolic Panel    Collection Time: 10/04/23  1:43 PM   Result Value Ref Range    Sodium 142 136 - 145 mmol/L    Potassium 4.4 3.5 - 5.1 mmol/L    Chloride 105 95 - 110 mmol/L    CO2 24 23 - 29 mmol/L    Glucose 91 70 - 110 mg/dL    BUN 11 6 - 20 mg/dL    Creatinine 1.2 0.5 - 1.4 mg/dL    Calcium 9.9 8.7 - 10.5 mg/dL    Total Protein 7.2 6.0 -  8.4 g/dL    Albumin 4.7 3.5 - 5.2 g/dL    Total Bilirubin 0.4 0.1 - 1.0 mg/dL    Alkaline Phosphatase 57 55 - 135 U/L    AST 26 10 - 40 U/L    ALT 27 10 - 44 U/L    eGFR >60.0 >60 mL/min/1.73 m^2    Anion Gap 13 8 - 16 mmol/L   TSH    Collection Time: 10/04/23  1:43 PM   Result Value Ref Range    TSH 1.256 0.400 - 4.000 uIU/mL   Lipid Panel    Collection Time: 10/04/23  1:43 PM   Result Value Ref Range    Cholesterol 198 120 - 199 mg/dL    Triglycerides 309 (H) 30 - 150 mg/dL    HDL 38 (L) 40 - 75 mg/dL    LDL Cholesterol 98.2 63.0 - 159.0 mg/dL    HDL/Cholesterol Ratio 19.2 (L) 20.0 - 50.0 %    Total Cholesterol/HDL Ratio 5.2 (H) 2.0 - 5.0    Non-HDL Cholesterol 160 mg/dL   Hemoglobin A1C    Collection Time: 10/04/23  1:43 PM   Result Value Ref Range    Hemoglobin A1C 5.0 4.0 - 5.6 %    Estimated Avg Glucose 97 68 - 131 mg/dL         Assessment    1. Weight loss counseling, encounter for    2. Class 1 drug-induced obesity without serious comorbidity with body mass index (BMI) of 33.0 to 33.9 in adult          Plan    Kiet was seen today for follow-up.    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      Looks good.  Doing well with weight loss.  Tolerating semaglutide just fine.      Okay if he wants to stay at 15 units weekly.  If he plateaus, he can self increase to 20 units (1 mg) weekly.  You should have plenty of medicine/refills available at the pharmacy, but will go ahead and send additional refills now, just in case.    FOLLOW-UP:  Follow up in about 6 months (around 8/19/2024) for recheck, annual exam.    I spent a total of 45 minutes face to face and non-face to face on the date of this visit.This includes  time preparing to see the patient (eg, review of tests, notes), obtaining and/or reviewing additional history from an independent historian and/or outside medical records, documenting clinical information in the electronic health record, independently interpreting results and/or communicating results to the patient/family/caregiver, or care coordinator.  Visit today included increased complexity associated with the care of the episodic problem addressed and managing the longitudinal care of the patient due to the serious and/or complex managed problem(s).    Signed by:  Reji Prescott MD

## 2024-12-24 DIAGNOSIS — F31.75 BIPOLAR DISORDER, IN PARTIAL REMISSION, MOST RECENT EPISODE DEPRESSED: ICD-10-CM

## 2024-12-24 RX ORDER — SERTRALINE HYDROCHLORIDE 50 MG/1
50 TABLET, FILM COATED ORAL
Qty: 90 TABLET | Refills: 2 | Status: SHIPPED | OUTPATIENT
Start: 2024-12-24 | End: 2024-12-26 | Stop reason: SDUPTHER

## 2024-12-25 DIAGNOSIS — F31.75 BIPOLAR DISORDER, IN PARTIAL REMISSION, MOST RECENT EPISODE DEPRESSED: ICD-10-CM

## 2024-12-26 DIAGNOSIS — F31.75 BIPOLAR DISORDER, IN PARTIAL REMISSION, MOST RECENT EPISODE DEPRESSED: ICD-10-CM

## 2024-12-26 RX ORDER — SERTRALINE HYDROCHLORIDE 50 MG/1
50 TABLET, FILM COATED ORAL DAILY
Qty: 90 TABLET | Refills: 2 | Status: SHIPPED | OUTPATIENT
Start: 2024-12-26

## 2024-12-26 RX ORDER — QUETIAPINE FUMARATE 100 MG/1
100 TABLET, FILM COATED ORAL NIGHTLY
Qty: 90 TABLET | Refills: 2 | Status: SHIPPED | OUTPATIENT
Start: 2024-12-26

## 2025-04-28 ENCOUNTER — TELEPHONE (OUTPATIENT)
Dept: PRIMARY CARE CLINIC | Facility: CLINIC | Age: 25
End: 2025-04-28
Payer: COMMERCIAL